# Patient Record
Sex: MALE | Race: WHITE | NOT HISPANIC OR LATINO | Employment: OTHER | URBAN - METROPOLITAN AREA
[De-identification: names, ages, dates, MRNs, and addresses within clinical notes are randomized per-mention and may not be internally consistent; named-entity substitution may affect disease eponyms.]

---

## 2018-03-28 ENCOUNTER — HOSPITAL ENCOUNTER (OUTPATIENT)
Dept: ORTHOPEDIC SURGERY | Facility: CLINIC | Age: 53
Discharge: HOME | End: 2018-03-28
Attending: ORTHOPAEDIC SURGERY | Admitting: ORTHOPAEDIC SURGERY

## 2018-05-01 ENCOUNTER — HOSPITAL ENCOUNTER (OUTPATIENT)
Dept: PHYSICAL THERAPY | Facility: HOSPITAL | Age: 53
Setting detail: RECURRING SERIES
Discharge: HOME | End: 2018-05-22
Attending: ORTHOPAEDIC SURGERY | Admitting: ORTHOPAEDIC SURGERY

## 2018-07-10 ENCOUNTER — HOSPITAL ENCOUNTER (OUTPATIENT)
Dept: PREADMISSION TESTING | Facility: HOSPITAL | Age: 53
Discharge: HOME | End: 2018-07-10
Attending: ORTHOPAEDIC SURGERY | Admitting: ORTHOPAEDIC SURGERY

## 2018-07-10 LAB
ANION GAP SERPL CALC-SCNC: 12.1 MMOL/L (ref 10–20)
APTT BLD: 22.8 SEC (ref 24–31)
BACTERIA SPEC AEROBE CULT: NORMAL
BASOPHILS # BLD AUTO: 0.1 10*3/UL (ref 0–0.2)
BASOPHILS NFR BLD AUTO: 1 % (ref 0–2)
BUN SERPL-MCNC: 7 MG/DL (ref 8–20)
BUN/CREAT SERPL: 7 (ref 6.2–20.3)
CALCIUM SERPL-MCNC: 9.1 MG/DL (ref 8.9–10.3)
CHLORIDE SERPL-SCNC: 103 MMOL/L (ref 101–111)
CONV CO2: 26 MMOL/L (ref 22–32)
CREAT UR-MCNC: 1 MG/DL (ref 0.7–1.2)
DIFFERENTIAL METHOD BLD: (no result)
EOSINOPHIL # BLD AUTO: 0.2 10*3/UL (ref 0–0.3)
EOSINOPHIL # BLD AUTO: 3 % (ref 0–3)
ERYTHROCYTE [DISTWIDTH] IN BLOOD BY AUTOMATED COUNT: 15.4 % (ref 11.5–14.5)
GLUCOSE SERPL-MCNC: 177 MG/DL (ref 65–99)
HCT VFR BLD AUTO: 39.9 % (ref 40–54)
HGB BLD-MCNC: 13.3 G/DL (ref 14–18)
INR PPP: 1
LYMPHOCYTES # BLD AUTO: 1.4 10*3/UL (ref 0.8–4.8)
LYMPHOCYTES NFR BLD AUTO: 21 % (ref 18–42)
Lab: NORMAL
MCH RBC QN AUTO: 25.7 PG (ref 26–32)
MCHC RBC AUTO-ENTMCNC: 33.2 G/DL (ref 32–36)
MCV RBC AUTO: 77.4 FL (ref 80–94)
MICRO REPORT STATUS: NORMAL
MONOCYTES # BLD AUTO: 0.5 10*3/UL (ref 0.1–1.3)
MONOCYTES NFR BLD AUTO: 8 % (ref 2–11)
NEUTROPHILS # BLD AUTO: 4.4 10*3/UL (ref 2.3–8.6)
NEUTROPHILS NFR BLD AUTO: 67 % (ref 50–75)
NRBC BLD AUTO-RTO: 0 /100{WBCS}
NRBC/RBC NFR BLD MANUAL: 0 10*3/UL
PLATELET # BLD AUTO: 227 10*3/UL (ref 150–450)
PMV BLD AUTO: 8.8 FL (ref 7.4–10.4)
POTASSIUM SERPL-SCNC: 4.1 MMOL/L (ref 3.6–5.1)
PROTHROMBIN TIME: 10.9 SEC (ref 9.6–11.7)
RBC # BLD AUTO: 5.16 10*6/UL (ref 4.6–6)
SODIUM SERPL-SCNC: 137 MMOL/L (ref 136–144)
SPECIMEN SOURCE: NORMAL
WBC # BLD AUTO: 6.6 10*3/UL (ref 4.5–11.5)

## 2018-07-13 ENCOUNTER — HOSPITAL ENCOUNTER (OUTPATIENT)
Dept: PREOP | Facility: HOSPITAL | Age: 53
Setting detail: HOSPITAL OUTPATIENT SURGERY
Discharge: HOME | End: 2018-07-13
Attending: ORTHOPAEDIC SURGERY | Admitting: ORTHOPAEDIC SURGERY

## 2018-07-13 LAB
GLUCOSE BLD-MCNC: 107 MG/DL (ref 70–105)
GLUCOSE BLD-MCNC: 141 MG/DL (ref 70–105)

## 2019-07-03 ENCOUNTER — OFFICE VISIT (OUTPATIENT)
Dept: FAMILY MEDICINE CLINIC | Facility: CLINIC | Age: 54
End: 2019-07-03

## 2019-07-03 ENCOUNTER — HOSPITAL ENCOUNTER (OUTPATIENT)
Dept: GENERAL RADIOLOGY | Facility: HOSPITAL | Age: 54
Discharge: HOME | End: 2019-07-03
Admitting: NURSE PRACTITIONER

## 2019-07-03 VITALS
HEART RATE: 76 BPM | DIASTOLIC BLOOD PRESSURE: 95 MMHG | WEIGHT: 225 LBS | SYSTOLIC BLOOD PRESSURE: 143 MMHG | HEIGHT: 72 IN | TEMPERATURE: 98.7 F | OXYGEN SATURATION: 98 % | BODY MASS INDEX: 30.48 KG/M2

## 2019-07-03 DIAGNOSIS — J40 BRONCHITIS: Primary | ICD-10-CM

## 2019-07-03 PROBLEM — E11.9 TYPE 2 DIABETES MELLITUS (HCC): Status: ACTIVE | Noted: 2018-05-03

## 2019-07-03 PROBLEM — G54.0 THORACIC OUTLET SYNDROME: Status: ACTIVE | Noted: 2017-08-15

## 2019-07-03 PROCEDURE — 99213 OFFICE O/P EST LOW 20 MIN: CPT | Performed by: NURSE PRACTITIONER

## 2019-07-03 PROCEDURE — 71046 X-RAY EXAM CHEST 2 VIEWS: CPT

## 2019-07-03 RX ORDER — ALBUTEROL SULFATE 90 UG/1
2 AEROSOL, METERED RESPIRATORY (INHALATION) EVERY 4 HOURS PRN
Qty: 1 INHALER | Refills: 0 | Status: SHIPPED | OUTPATIENT
Start: 2019-07-03 | End: 2020-03-09

## 2019-07-03 RX ORDER — BLOOD-GLUCOSE METER
EACH MISCELLANEOUS
COMMUNITY
Start: 2018-05-10

## 2019-07-03 RX ORDER — BENZONATATE 100 MG/1
100 CAPSULE ORAL 3 TIMES DAILY PRN
Qty: 30 CAPSULE | Refills: 0 | Status: SHIPPED | OUTPATIENT
Start: 2019-07-03 | End: 2020-03-09

## 2019-07-03 RX ORDER — LANCETS 33 GAUGE
EACH MISCELLANEOUS
COMMUNITY
Start: 2018-05-10

## 2019-07-03 RX ORDER — DEXTROMETHORPHAN HYDROBROMIDE AND PROMETHAZINE HYDROCHLORIDE 15; 6.25 MG/5ML; MG/5ML
5 SYRUP ORAL NIGHTLY PRN
Qty: 180 ML | Refills: 0 | Status: SHIPPED | OUTPATIENT
Start: 2019-07-03 | End: 2020-03-09

## 2019-07-03 RX ORDER — LOSARTAN POTASSIUM 50 MG/1
50 TABLET ORAL DAILY
Refills: 2 | COMMUNITY
Start: 2019-04-03 | End: 2019-12-06 | Stop reason: SDUPTHER

## 2019-07-03 RX ORDER — OMEPRAZOLE 20 MG/1
CAPSULE, DELAYED RELEASE ORAL
COMMUNITY
Start: 2015-08-24 | End: 2021-09-21 | Stop reason: SDUPTHER

## 2019-07-03 NOTE — PATIENT INSTRUCTIONS
Use Zyrtec daily  Postnasal Drip  Postnasal drip is the feeling of mucus going down the back of your throat. Mucus is a slimy substance that moistens and cleans your nose and throat, as well as the air pockets in face bones near your forehead and cheeks (sinuses). Small amounts of mucus pass from your nose and sinuses down the back of your throat all the time. This is normal. When you produce too much mucus or the mucus gets too thick, you can feel it.  Some common causes of postnasal drip include:  · Having more mucus because of:  ? A cold or the flu.  ? Allergies.  ? Cold air.  ? Certain medicines.  · Having more mucus that is thicker because of:  ? A sinus or nasal infection.  ? Dry air.  ? A food allergy.    Follow these instructions at home:  Relieving discomfort  · Gargle with a salt-water mixture 3-4 times a day or as needed. To make a salt-water mixture, completely dissolve ½-1 tsp of salt in 1 cup of warm water.  · If the air in your home is dry, use a humidifier to add moisture to the air.  · Use a saline spray or container (neti pot) to flush out the nose (nasal irrigation). These methods can help clear away mucus and keep the nasal passages moist.  General instructions  · Take over-the-counter and prescription medicines only as told by your health care provider.  · Follow instructions from your health care provider about eating or drinking restrictions. You may need to avoid caffeine.  · Avoid things that you know you are allergic to (allergens), like dust, mold, pollen, pets, or certain foods.  · Drink enough fluid to keep your urine pale yellow.  · Keep all follow-up visits as told by your health care provider. This is important.  Contact a health care provider if:  · You have a fever.  · You have a sore throat.  · You have difficulty swallowing.  · You have headache.  · You have sinus pain.  · You have a cough that does not go away.  · The mucus from your nose becomes thick and is green or yellow in  color.  · You have cold or flu symptoms that last more than 10 days.  Summary  · Postnasal drip is the feeling of mucus going down the back of your throat.  · If your health care provider approves, use nasal irrigation or a nasal spray 2?4 times a day.  · Avoid things that you know you are allergic to (allergens), like dust, mold, pollen, pets, or certain foods.  This information is not intended to replace advice given to you by your health care provider. Make sure you discuss any questions you have with your health care provider.  Document Released: 04/02/2018 Document Revised: 04/02/2018 Document Reviewed: 04/02/2018  [employer] Patient Education © 2019 [employer]

## 2019-07-03 NOTE — PROGRESS NOTES
Subjective   Dhruv Levine is a 53 y.o. male who presents for evaluation of nonproductive cough. Symptoms began 1 month ago. Symptoms have been gradually improving since that time. He reports symptoms started with cough, nasal congestion, sore throat. Nasal congestion and sore throat resolved but continues to have nagging cough.  Denies SOA. Denies fever.  He does have some chest tightness at times with cough. Cough is not productive. Denies wheezing.    The following portions of the patient's history were reviewed and updated as appropriate: allergies, current medications, past family history, past medical history, past social history, past surgical history and problem list.    Review of Systems  Constitutional: negative  Eyes: negative  Ears, nose, mouth, throat, and face: negative  Respiratory: positive for cough  Cardiovascular: negative     Objective    General appearance: alert, appears stated age, cooperative and no distress  Head: Normocephalic, without obvious abnormality, atraumatic  Eyes: conjunctivae/corneas clear. PERRL, EOM's intact. Fundi benign.  Ears: normal TM's and external ear canals both ears  Nose: Nares normal. Septum midline. Mucosa normal. No drainage or sinus tenderness.  Throat: lips, mucosa, and tongue normal; teeth and gums normal  Lungs: clear to auscultation bilaterally  Heart: regular rate and rhythm, S1, S2 normal, no murmur, click, rub or gallop     Assessment/Plan   URI with Post Nasal Drip   -  Send CXR r/o pneumonia  -  Treat with prn cough medication.     Worsening signs and symptoms discussed.  Rest, fluids, acetaminophen, and humidification.  Follow up as needed for persistent, worsening cough, or appearance of new symptoms.

## 2019-12-06 RX ORDER — LOSARTAN POTASSIUM 50 MG/1
50 TABLET ORAL DAILY
Qty: 30 TABLET | Refills: 2 | Status: SHIPPED | OUTPATIENT
Start: 2019-12-06 | End: 2020-03-09 | Stop reason: SDUPTHER

## 2019-12-26 ENCOUNTER — TELEPHONE (OUTPATIENT)
Dept: FAMILY MEDICINE CLINIC | Facility: CLINIC | Age: 54
End: 2019-12-26

## 2020-03-09 ENCOUNTER — OFFICE VISIT (OUTPATIENT)
Dept: FAMILY MEDICINE CLINIC | Facility: CLINIC | Age: 55
End: 2020-03-09

## 2020-03-09 VITALS
OXYGEN SATURATION: 95 % | DIASTOLIC BLOOD PRESSURE: 95 MMHG | HEIGHT: 72 IN | WEIGHT: 229.4 LBS | BODY MASS INDEX: 31.07 KG/M2 | SYSTOLIC BLOOD PRESSURE: 147 MMHG | HEART RATE: 86 BPM

## 2020-03-09 DIAGNOSIS — I10 ESSENTIAL HYPERTENSION: Primary | ICD-10-CM

## 2020-03-09 DIAGNOSIS — Z00.00 PREVENTATIVE HEALTH CARE: ICD-10-CM

## 2020-03-09 DIAGNOSIS — Z12.5 PROSTATE CANCER SCREENING: ICD-10-CM

## 2020-03-09 DIAGNOSIS — K22.719 BARRETT'S ESOPHAGUS WITH DYSPLASIA: ICD-10-CM

## 2020-03-09 DIAGNOSIS — E11.9 TYPE 2 DIABETES MELLITUS WITHOUT COMPLICATION, WITHOUT LONG-TERM CURRENT USE OF INSULIN (HCC): ICD-10-CM

## 2020-03-09 LAB
DEPRECATED RDW RBC AUTO: 37.6 FL (ref 37–54)
ERYTHROCYTE [DISTWIDTH] IN BLOOD BY AUTOMATED COUNT: 13 % (ref 12.3–15.4)
HBA1C MFR BLD: 5.9 % (ref 3.5–5.6)
HCT VFR BLD AUTO: 45.3 % (ref 37.5–51)
HGB BLD-MCNC: 14.9 G/DL (ref 13–17.7)
MCH RBC QN AUTO: 26.7 PG (ref 26.6–33)
MCHC RBC AUTO-ENTMCNC: 32.9 G/DL (ref 31.5–35.7)
MCV RBC AUTO: 81.2 FL (ref 79–97)
PLATELET # BLD AUTO: 244 10*3/MM3 (ref 140–450)
PMV BLD AUTO: 10.6 FL (ref 6–12)
RBC # BLD AUTO: 5.58 10*6/MM3 (ref 4.14–5.8)
WBC NRBC COR # BLD: 6.09 10*3/MM3 (ref 3.4–10.8)

## 2020-03-09 PROCEDURE — 83036 HEMOGLOBIN GLYCOSYLATED A1C: CPT | Performed by: NURSE PRACTITIONER

## 2020-03-09 PROCEDURE — 84443 ASSAY THYROID STIM HORMONE: CPT | Performed by: NURSE PRACTITIONER

## 2020-03-09 PROCEDURE — 36415 COLL VENOUS BLD VENIPUNCTURE: CPT | Performed by: NURSE PRACTITIONER

## 2020-03-09 PROCEDURE — 80061 LIPID PANEL: CPT | Performed by: NURSE PRACTITIONER

## 2020-03-09 PROCEDURE — G0103 PSA SCREENING: HCPCS | Performed by: NURSE PRACTITIONER

## 2020-03-09 PROCEDURE — 85027 COMPLETE CBC AUTOMATED: CPT | Performed by: NURSE PRACTITIONER

## 2020-03-09 PROCEDURE — 99214 OFFICE O/P EST MOD 30 MIN: CPT | Performed by: NURSE PRACTITIONER

## 2020-03-09 PROCEDURE — 80053 COMPREHEN METABOLIC PANEL: CPT | Performed by: NURSE PRACTITIONER

## 2020-03-09 RX ORDER — LOSARTAN POTASSIUM 50 MG/1
50 TABLET ORAL DAILY
Qty: 30 TABLET | Refills: 11 | Status: SHIPPED | OUTPATIENT
Start: 2020-03-09 | End: 2021-02-24

## 2020-03-09 NOTE — ASSESSMENT & PLAN NOTE
Hypertension is stable.  Continue current treatment regimen.  Dietary sodium restriction.  Regular aerobic exercise.  Continue current medications.  Blood pressure will be reassessed at the next regular appointment.

## 2020-03-09 NOTE — PROGRESS NOTES
Chief Complaint   Patient presents with   • Establish Care   • Med Refill       HPI     Diabetes mellitus type II, feels stable on meds, denies any signs/symptoms of hyper/hypoglycemia, blurry vision, polydipsia, polyuria, nocturia, and unintentional weight loss. Reports -110 daily    HTN, stable on meds and takes as directed, denies chest pain, headache, shortness of air, palpitations and swelling of extremities.           Past Medical History:   Diagnosis Date   • Abdominal pain, epigastric    • Acute left otitis media    • Andersen's esophagus    • Biceps tendonitis, right    • BPPV (benign paroxysmal positional vertigo), left    • Bronchitis, acute    • Diabetes mellitus type II, controlled (CMS/[facility])    • Diverticulosis    • Dyspnea    • Exposure to hepatitis A    • Fatigue    • GERD (gastroesophageal reflux disease)    • Hand swelling    • HBP (high blood pressure)    • Hiatal hernia    • Insomnia    • Mass of finger    • Orthopedic aftercare    • Overweight    • Pain in right shoulder    • Persistent cough    • Sinus congestion    • Situational anxiety    • Thoracic outlet syndrome      Past Surgical History:   Procedure Laterality Date   • CHOLECYSTECTOMY     • SHOULDER ARTHROSCOPY Right 07/13/2018   • SHOULDER SURGERY Right 2011/2017     Family History   Problem Relation Age of Onset   • No Known Problems Mother    • Hypertension Father    • No Known Problems Sister    • No Known Problems Brother    • No Known Problems Daughter    • No Known Problems Son    • No Known Problems Maternal Aunt    • No Known Problems Maternal Uncle    • No Known Problems Paternal Aunt    • No Known Problems Paternal Uncle    • No Known Problems Maternal Grandmother    • No Known Problems Maternal Grandfather    • No Known Problems Paternal Grandmother    • No Known Problems Paternal Grandfather      Social History     Tobacco Use   • Smoking status: Never Smoker   • Smokeless tobacco: Never Used   Substance Use Topics   •  Alcohol use: Yes         Current Outpatient Medications:   •  Blood Glucose Monitoring Suppl (ONE TOUCH ULTRA 2) w/Device kit, ONETOUCH ULTRA 2 w/Device KIT, Disp: , Rfl:   •  glucose blood (ONE TOUCH ULTRA TEST) test strip, ONETOUCH ULTRA BLUE STRP, Disp: , Rfl:   •  losartan (COZAAR) 50 MG tablet, Take 1 tablet by mouth Daily., Disp: 30 tablet, Rfl: 11  •  metFORMIN (GLUCOPHAGE) 500 MG tablet, Take 1 tablet by mouth 2 (Two) Times a Day With Meals., Disp: 60 tablet, Rfl: 11  •  omeprazole (priLOSEC) 20 MG capsule, OMEPRAZOLE 20 MG CPDR, Disp: , Rfl:   •  ONETOUCH DELICA LANCETS 33G misc, ONETOUCH DELICA LANCETS 33G, Disp: , Rfl:         The following portions of the patient's history were reviewed and updated as appropriate: allergies, current medications, past family history, past medical history, past social history, past surgical history and problem list.    Review of Systems   Constitutional: Negative for chills, fatigue and fever.   HENT: Negative for dental problem, ear pain, sinus pressure and sore throat.    Eyes: Negative for visual disturbance.   Respiratory: Negative for cough, shortness of breath and wheezing.    Gastrointestinal: Negative for abdominal pain, blood in stool, constipation, diarrhea, nausea, vomiting and GERD.   Genitourinary: Negative for difficulty urinating, frequency, urgency and urinary incontinence.   Musculoskeletal: Negative for arthralgias, back pain, gait problem, joint swelling, myalgias and neck pain.   Skin: Negative for dry skin, pallor and rash.   Neurological: Negative for dizziness, seizures, speech difficulty and weakness.   Hematological: Negative for adenopathy.   Psychiatric/Behavioral: Negative for sleep disturbance, depressed mood and stress. The patient is not nervous/anxious.      Vitals:    03/09/20 0822   BP: 147/95   BP Location: Left arm   Patient Position: Sitting   Cuff Size: Large Adult   Pulse: 86   SpO2: 95%   Weight: 104 kg (229 lb 6.4 oz)   Height:  "182.9 cm (72.01\")     Body mass index is 31.11 kg/m².  Physical Exam   Constitutional: He is oriented to person, place, and time. He appears well-developed and well-nourished. No distress.   HENT:   Head: Normocephalic and atraumatic.   Eyes: Pupils are equal, round, and reactive to light.   Neck: Normal range of motion. No thyromegaly present.   Cardiovascular: Normal rate, regular rhythm, normal heart sounds and intact distal pulses.   Pulmonary/Chest: Effort normal and breath sounds normal. No respiratory distress.   Abdominal: Soft. Bowel sounds are normal. He exhibits no distension. There is no tenderness.   Musculoskeletal: Normal range of motion.   Neurological: He is alert and oriented to person, place, and time.   Skin: Skin is warm and dry. He is not diaphoretic. No erythema.   Psychiatric: He has a normal mood and affect. His behavior is normal. Judgment and thought content normal.   Nursing note and vitals reviewed.    No visits with results within 7 Day(s) from this visit.   Latest known visit with results is:   No results found for any previous visit.       Diagnoses and all orders for this visit:    1. Essential hypertension (Primary)  Assessment & Plan:  Hypertension is stable.  Continue current treatment regimen.  Dietary sodium restriction.  Regular aerobic exercise.  Continue current medications.  Blood pressure will be reassessed at the next regular appointment.    Orders:  -     Comprehensive Metabolic Panel  -     CBC (No Diff)  -     TSH  -     Lipid Panel    2. Preventative health care    3. Type 2 diabetes mellitus without complication, without long-term current use of insulin (CMS/[facility])  Assessment & Plan:  Diabetes is unchanged.   Continue current treatment regimen.  Regular aerobic exercise.  Discussed foot care.  Reminded to get yearly retinal exam.  Diabetes will be reassessed in 1 year.    Orders:  -     Hemoglobin A1c    4. Prostate cancer screening  -     PSA Screen    5. Andersen's " esophagus with dysplasia  Assessment & Plan:  Patient is due for repeat EGD/colonoscopy.  Instructed/recommended patient to call gastro to reschedule      Other orders  -     losartan (COZAAR) 50 MG tablet; Take 1 tablet by mouth Daily.  Dispense: 30 tablet; Refill: 11  -     metFORMIN (GLUCOPHAGE) 500 MG tablet; Take 1 tablet by mouth 2 (Two) Times a Day With Meals.  Dispense: 60 tablet; Refill: 11    Check labs above, and notify pt of results  rf meds  If labs stable will plan to see back in 1 year or earlier if needed

## 2020-03-09 NOTE — ASSESSMENT & PLAN NOTE
Patient is due for repeat EGD/colonoscopy.  Instructed/recommended patient to call gastro to reschedule

## 2020-03-09 NOTE — ASSESSMENT & PLAN NOTE
Diabetes is unchanged.   Continue current treatment regimen.  Regular aerobic exercise.  Discussed foot care.  Reminded to get yearly retinal exam.  Diabetes will be reassessed in 1 year.

## 2020-03-10 LAB
ALBUMIN SERPL-MCNC: 4.6 G/DL (ref 3.5–5.2)
ALBUMIN/GLOB SERPL: 1.7 G/DL
ALP SERPL-CCNC: 75 U/L (ref 39–117)
ALT SERPL W P-5'-P-CCNC: 22 U/L (ref 1–41)
ANION GAP SERPL CALCULATED.3IONS-SCNC: 13.1 MMOL/L (ref 5–15)
AST SERPL-CCNC: 21 U/L (ref 1–40)
BILIRUB SERPL-MCNC: 0.4 MG/DL (ref 0.2–1.2)
BUN BLD-MCNC: 11 MG/DL (ref 6–20)
BUN/CREAT SERPL: 11.7 (ref 7–25)
CALCIUM SPEC-SCNC: 9.7 MG/DL (ref 8.6–10.5)
CHLORIDE SERPL-SCNC: 102 MMOL/L (ref 98–107)
CHOLEST SERPL-MCNC: 182 MG/DL (ref 0–200)
CO2 SERPL-SCNC: 25.9 MMOL/L (ref 22–29)
CREAT BLD-MCNC: 0.94 MG/DL (ref 0.76–1.27)
GFR SERPL CREATININE-BSD FRML MDRD: 83 ML/MIN/1.73
GLOBULIN UR ELPH-MCNC: 2.7 GM/DL
GLUCOSE BLD-MCNC: 105 MG/DL (ref 65–99)
HDLC SERPL-MCNC: 38 MG/DL (ref 40–60)
LDLC SERPL CALC-MCNC: 126 MG/DL (ref 0–100)
LDLC/HDLC SERPL: 3.32 {RATIO}
POTASSIUM BLD-SCNC: 4.8 MMOL/L (ref 3.5–5.2)
PROT SERPL-MCNC: 7.3 G/DL (ref 6–8.5)
PSA SERPL-MCNC: 1.99 NG/ML (ref 0–4)
SODIUM BLD-SCNC: 141 MMOL/L (ref 136–145)
TRIGL SERPL-MCNC: 90 MG/DL (ref 0–150)
TSH SERPL DL<=0.05 MIU/L-ACNC: 1.05 UIU/ML (ref 0.27–4.2)
VLDLC SERPL-MCNC: 18 MG/DL (ref 5–40)

## 2020-09-15 ENCOUNTER — OFFICE VISIT (OUTPATIENT)
Dept: FAMILY MEDICINE CLINIC | Facility: CLINIC | Age: 55
End: 2020-09-15

## 2020-09-15 VITALS
SYSTOLIC BLOOD PRESSURE: 162 MMHG | BODY MASS INDEX: 30.77 KG/M2 | WEIGHT: 227.2 LBS | HEART RATE: 66 BPM | HEIGHT: 72 IN | OXYGEN SATURATION: 100 % | TEMPERATURE: 97.3 F | DIASTOLIC BLOOD PRESSURE: 95 MMHG

## 2020-09-15 DIAGNOSIS — G47.09 OTHER INSOMNIA: ICD-10-CM

## 2020-09-15 DIAGNOSIS — Z00.00 PREVENTATIVE HEALTH CARE: ICD-10-CM

## 2020-09-15 DIAGNOSIS — I10 ESSENTIAL HYPERTENSION: ICD-10-CM

## 2020-09-15 DIAGNOSIS — K21.9 GASTROESOPHAGEAL REFLUX DISEASE WITHOUT ESOPHAGITIS: ICD-10-CM

## 2020-09-15 DIAGNOSIS — K22.719 BARRETT'S ESOPHAGUS WITH DYSPLASIA: ICD-10-CM

## 2020-09-15 DIAGNOSIS — E11.9 TYPE 2 DIABETES MELLITUS WITHOUT COMPLICATION, WITHOUT LONG-TERM CURRENT USE OF INSULIN (HCC): Primary | ICD-10-CM

## 2020-09-15 LAB — HBA1C MFR BLD: 5.9 % (ref 3.5–5.6)

## 2020-09-15 PROCEDURE — 80053 COMPREHEN METABOLIC PANEL: CPT | Performed by: NURSE PRACTITIONER

## 2020-09-15 PROCEDURE — 84443 ASSAY THYROID STIM HORMONE: CPT | Performed by: NURSE PRACTITIONER

## 2020-09-15 PROCEDURE — 80061 LIPID PANEL: CPT | Performed by: NURSE PRACTITIONER

## 2020-09-15 PROCEDURE — 86803 HEPATITIS C AB TEST: CPT | Performed by: NURSE PRACTITIONER

## 2020-09-15 PROCEDURE — 36415 COLL VENOUS BLD VENIPUNCTURE: CPT | Performed by: NURSE PRACTITIONER

## 2020-09-15 PROCEDURE — 83036 HEMOGLOBIN GLYCOSYLATED A1C: CPT | Performed by: NURSE PRACTITIONER

## 2020-09-15 PROCEDURE — 99396 PREV VISIT EST AGE 40-64: CPT | Performed by: NURSE PRACTITIONER

## 2020-09-15 PROCEDURE — 85027 COMPLETE CBC AUTOMATED: CPT | Performed by: NURSE PRACTITIONER

## 2020-09-15 RX ORDER — HYDROXYZINE HYDROCHLORIDE 25 MG/1
25 TABLET, FILM COATED ORAL NIGHTLY PRN
Qty: 40 TABLET | Refills: 0 | Status: SHIPPED | OUTPATIENT
Start: 2020-09-15 | End: 2021-03-24

## 2020-09-15 NOTE — PROGRESS NOTES
Chief Complaint   Patient presents with   • Hypertension     patient has had colonoscopy but doesn't remember when.   • Follow-up     6mo   • Diabetes       HPI     HTN, stable on meds and takes as directed, denies chest pain, headache, shortness of air, palpitations and swelling of extremities.     Diabetes mellitus type II, feels stable on meds, denies any signs/symptoms of hyper/hypoglycemia, blurry vision, polydipsia, polyuria, nocturia, and unintentional weight loss, previous hemoglobin A1c March 2020, 5.9. BG running 108-102.     Hyperlipidemia, slight elevation of LDL on March labs, he mostly eats well, is active and exercises 2-3 times per week, the patient is not on a statin, denies muscle aches, constipation, diarrhea, GI upset, fatigue, chest pain/pressure, exercise intolerance, dyspnea, palpitations, syncope and pedal edema.      GERD, with history of diverticulosis and Andersen's esophagus, last colonoscopy per Dr. Weber, approximately 5 years ago and [employer] insurance apparently will not cover procedures.  Patient reports he is stable on OTC omeprazole, denies nausea, vomiting, constipation, abdominal pain and diarrhea.    Insomnia, started more than 1 year ago, patient reports no difficulty falling asleep but does not stay asleep, mostly associated with anxiety, wakes up with his mind racing.  Patient denies abdominal pain, arthralgias, chills, coughing, fever, joint swelling, myalgias, nausea, neck pain, rash, sore throat, vomiting or weakness, pt reports daytime fatigue. Treatments tried: OTC sleep aids ineffective including melatonin.        The following portions of the patient's history were reviewed and updated as appropriate: allergies, current medications, past family history, past medical history, past social history, past surgical history and problem list.    Past Medical History:   Diagnosis Date   • Abdominal pain, epigastric    • Acute left otitis media    • Andersen's esophagus    • Biceps  tendonitis, right    • BPPV (benign paroxysmal positional vertigo), left    • Bronchitis, acute    • Diabetes mellitus type II, controlled (CMS/HCC)    • Diverticulosis    • Dyspnea    • Exposure to hepatitis A    • Fatigue    • GERD (gastroesophageal reflux disease)    • Hand swelling    • HBP (high blood pressure)    • Hiatal hernia    • Insomnia    • Mass of finger    • Orthopedic aftercare    • Overweight    • Pain in right shoulder    • Persistent cough    • Sinus congestion    • Situational anxiety    • Thoracic outlet syndrome      Past Surgical History:   Procedure Laterality Date   • CHOLECYSTECTOMY     • SHOULDER ARTHROSCOPY Right 07/13/2018   • SHOULDER SURGERY Right 2011/2017     Family History   Problem Relation Age of Onset   • No Known Problems Mother    • Hypertension Father    • No Known Problems Sister    • No Known Problems Brother    • No Known Problems Daughter    • No Known Problems Son    • No Known Problems Maternal Aunt    • No Known Problems Maternal Uncle    • No Known Problems Paternal Aunt    • No Known Problems Paternal Uncle    • No Known Problems Maternal Grandmother    • No Known Problems Maternal Grandfather    • No Known Problems Paternal Grandmother    • No Known Problems Paternal Grandfather      Social History     Tobacco Use   • Smoking status: Never Smoker   • Smokeless tobacco: Never Used   Substance Use Topics   • Alcohol use: Yes         Current Outpatient Medications:   •  Blood Glucose Monitoring Suppl (ONE TOUCH ULTRA 2) w/Device kit, ONETOUCH ULTRA 2 w/Device KIT, Disp: , Rfl:   •  glucose blood (ONE TOUCH ULTRA TEST) test strip, ONETOUCH ULTRA BLUE STRP, Disp: , Rfl:   •  losartan (COZAAR) 50 MG tablet, Take 1 tablet by mouth Daily., Disp: 30 tablet, Rfl: 11  •  metFORMIN (GLUCOPHAGE) 500 MG tablet, Take 1 tablet by mouth 2 (Two) Times a Day With Meals., Disp: 60 tablet, Rfl: 11  •  omeprazole (priLOSEC) 20 MG capsule, OMEPRAZOLE 20 MG CPDR, Disp: , Rfl:   •  ONETOUCH  "DELICA LANCETS 33G [facility], RAVINDERUCH DELICA LANCETS 33G, Disp: , Rfl:   •  hydrOXYzine (ATARAX) 25 MG tablet, Take 1 tablet by mouth At Night As Needed for Anxiety., Disp: 40 tablet, Rfl: 0      Review of Systems       Obtained as mentioned in HPI, otherwise negative.       Vitals:    09/15/20 1024   BP: (!) 153/103   BP Location: Left arm   Patient Position: Sitting   Cuff Size: Adult   Pulse: 66   Temp: 97.3 °F (36.3 °C)   TempSrc: Skin   SpO2: 100%   Weight: 103 kg (227 lb 3.2 oz)   Height: 182.9 cm (72.01\")     Body mass index is 30.81 kg/m².    Physical Exam  Vitals signs and nursing note reviewed.   Constitutional:       General: He is not in acute distress.     Appearance: He is well-developed. He is not diaphoretic.   HENT:      Head: Normocephalic and atraumatic.   Eyes:      Pupils: Pupils are equal, round, and reactive to light.   Neck:      Musculoskeletal: Normal range of motion.      Thyroid: No thyromegaly.   Cardiovascular:      Rate and Rhythm: Normal rate and regular rhythm.      Heart sounds: Normal heart sounds. No murmur.   Pulmonary:      Effort: Pulmonary effort is normal. No respiratory distress.      Breath sounds: Normal breath sounds.   Abdominal:      General: Bowel sounds are normal. There is no distension.      Palpations: Abdomen is soft.      Tenderness: There is no abdominal tenderness.   Musculoskeletal: Normal range of motion.   Skin:     General: Skin is warm and dry.      Findings: No erythema.   Neurological:      Mental Status: He is alert and oriented to person, place, and time.   Psychiatric:         Behavior: Behavior normal.         Thought Content: Thought content normal.         Judgment: Judgment normal.         No visits with results within 7 Day(s) from this visit.   Latest known visit with results is:   Office Visit on 03/09/2020   Component Date Value Ref Range Status   • Glucose 03/09/2020 105* 65 - 99 mg/dL Final   • BUN 03/09/2020 11  6 - 20 mg/dL Final   • " Creatinine 03/09/2020 0.94  0.76 - 1.27 mg/dL Final   • Sodium 03/09/2020 141  136 - 145 mmol/L Final   • Potassium 03/09/2020 4.8  3.5 - 5.2 mmol/L Final   • Chloride 03/09/2020 102  98 - 107 mmol/L Final   • CO2 03/09/2020 25.9  22.0 - 29.0 mmol/L Final   • Calcium 03/09/2020 9.7  8.6 - 10.5 mg/dL Final   • Total Protein 03/09/2020 7.3  6.0 - 8.5 g/dL Final   • Albumin 03/09/2020 4.60  3.50 - 5.20 g/dL Final   • ALT (SGPT) 03/09/2020 22  1 - 41 U/L Final   • AST (SGOT) 03/09/2020 21  1 - 40 U/L Final   • Alkaline Phosphatase 03/09/2020 75  39 - 117 U/L Final   • Total Bilirubin 03/09/2020 0.4  0.2 - 1.2 mg/dL Final   • eGFR Non African Amer 03/09/2020 83  >60 mL/min/1.73 Final   • Globulin 03/09/2020 2.7  gm/dL Final   • A/G Ratio 03/09/2020 1.7  g/dL Final   • BUN/Creatinine Ratio 03/09/2020 11.7  7.0 - 25.0 Final   • Anion Gap 03/09/2020 13.1  5.0 - 15.0 mmol/L Final   • PSA 03/09/2020 1.990  0.000 - 4.000 ng/mL Final   • WBC 03/09/2020 6.09  3.40 - 10.80 10*3/mm3 Final   • RBC 03/09/2020 5.58  4.14 - 5.80 10*6/mm3 Final   • Hemoglobin 03/09/2020 14.9  13.0 - 17.7 g/dL Final   • Hematocrit 03/09/2020 45.3  37.5 - 51.0 % Final   • MCV 03/09/2020 81.2  79.0 - 97.0 fL Final   • MCH 03/09/2020 26.7  26.6 - 33.0 pg Final   • MCHC 03/09/2020 32.9  31.5 - 35.7 g/dL Final   • RDW 03/09/2020 13.0  12.3 - 15.4 % Final   • RDW-SD 03/09/2020 37.6  37.0 - 54.0 fl Final   • MPV 03/09/2020 10.6  6.0 - 12.0 fL Final   • Platelets 03/09/2020 244  140 - 450 10*3/mm3 Final   • Hemoglobin A1C 03/09/2020 5.9* 3.5 - 5.6 % Final   • TSH 03/09/2020 1.050  0.270 - 4.200 uIU/mL Final   • Total Cholesterol 03/09/2020 182  0 - 200 mg/dL Final   • Triglycerides 03/09/2020 90  0 - 150 mg/dL Final   • HDL Cholesterol 03/09/2020 38* 40 - 60 mg/dL Final   • LDL Cholesterol  03/09/2020 126* 0 - 100 mg/dL Final   • VLDL Cholesterol 03/09/2020 18  5 - 40 mg/dL Final   • LDL/HDL Ratio 03/09/2020 3.32   Final       Diagnoses and all orders for this  visit:    1. Type 2 diabetes mellitus without complication, without long-term current use of insulin (CMS/[facility]) (Primary)  Comments:  Stable, continue metformin, no refill needed today.  Check A1c and urine, notify patient results  Orders:  -     Hemoglobin A1c  -     MicroAlbumin, Urine, Random - Urine, Clean Catch    2. Essential hypertension  Comments:  bp elevated, cont losartan, pt to check at home and notify if sbp >150. adjust prn  Orders:  -     Lipid Panel  -     Comprehensive Metabolic Panel  -     CBC (No Diff)  -     TSH    3. Gastroesophageal reflux disease without esophagitis  Comments:  cont omeprazole prn    4. Andersen's esophagus with dysplasia  Comments:  needs to repeat EGD/colonoscopy when insurance will cover, f/u with Dr. Elaine.     5. Other insomnia  Comments:  likely 2/2 anxiety/stress, trial hydroxyzine prn for anxiety/insomnia.  Notify if no improvement in sleep, consider other sleep aids.    6. Preventative health care  Comments:  ok to cont zinc anc MV  declines immunizations  PSA wnl 3/2020  labs collected today, fasting, will notify results.   Orders:  -     Hepatitis C Antibody    Other orders  -     hydrOXYzine (ATARAX) 25 MG tablet; Take 1 tablet by mouth At Night As Needed for Anxiety.  Dispense: 40 tablet; Refill: 0    Will call lab results to patient  Return in about 6 months (around 3/15/2021) for HTN, DM, GERD.

## 2020-09-16 LAB
ALBUMIN SERPL-MCNC: 4.5 G/DL (ref 3.5–5.2)
ALBUMIN/GLOB SERPL: 1.7 G/DL
ALP SERPL-CCNC: 86 U/L (ref 39–117)
ALT SERPL W P-5'-P-CCNC: 26 U/L (ref 1–41)
ANION GAP SERPL CALCULATED.3IONS-SCNC: 11.9 MMOL/L (ref 5–15)
AST SERPL-CCNC: 22 U/L (ref 1–40)
BILIRUB SERPL-MCNC: 0.4 MG/DL (ref 0–1.2)
BUN SERPL-MCNC: 11 MG/DL (ref 6–20)
BUN/CREAT SERPL: 10.3 (ref 7–25)
CALCIUM SPEC-SCNC: 9.2 MG/DL (ref 8.6–10.5)
CHLORIDE SERPL-SCNC: 104 MMOL/L (ref 98–107)
CHOLEST SERPL-MCNC: 182 MG/DL (ref 0–200)
CO2 SERPL-SCNC: 25.1 MMOL/L (ref 22–29)
CREAT SERPL-MCNC: 1.07 MG/DL (ref 0.76–1.27)
DEPRECATED RDW RBC AUTO: 39.4 FL (ref 37–54)
ERYTHROCYTE [DISTWIDTH] IN BLOOD BY AUTOMATED COUNT: 13.4 % (ref 12.3–15.4)
GFR SERPL CREATININE-BSD FRML MDRD: 72 ML/MIN/1.73
GLOBULIN UR ELPH-MCNC: 2.6 GM/DL
GLUCOSE SERPL-MCNC: 96 MG/DL (ref 65–99)
HCT VFR BLD AUTO: 44.6 % (ref 37.5–51)
HCV AB SER DONR QL: NORMAL
HDLC SERPL-MCNC: 37 MG/DL (ref 40–60)
HGB BLD-MCNC: 15.1 G/DL (ref 13–17.7)
LDLC SERPL CALC-MCNC: 125 MG/DL (ref 0–100)
LDLC/HDLC SERPL: 3.38 {RATIO}
MCH RBC QN AUTO: 28 PG (ref 26.6–33)
MCHC RBC AUTO-ENTMCNC: 33.9 G/DL (ref 31.5–35.7)
MCV RBC AUTO: 82.6 FL (ref 79–97)
PLATELET # BLD AUTO: 213 10*3/MM3 (ref 140–450)
PMV BLD AUTO: 11.1 FL (ref 6–12)
POTASSIUM SERPL-SCNC: 4.6 MMOL/L (ref 3.5–5.2)
PROT SERPL-MCNC: 7.1 G/DL (ref 6–8.5)
RBC # BLD AUTO: 5.4 10*6/MM3 (ref 4.14–5.8)
SODIUM SERPL-SCNC: 141 MMOL/L (ref 136–145)
TRIGL SERPL-MCNC: 99 MG/DL (ref 0–150)
TSH SERPL DL<=0.05 MIU/L-ACNC: 0.82 UIU/ML (ref 0.27–4.2)
VLDLC SERPL-MCNC: 19.8 MG/DL (ref 5–40)
WBC # BLD AUTO: 5.47 10*3/MM3 (ref 3.4–10.8)

## 2021-02-24 RX ORDER — LOSARTAN POTASSIUM 50 MG/1
TABLET ORAL
Qty: 90 TABLET | Refills: 0 | Status: SHIPPED | OUTPATIENT
Start: 2021-02-24 | End: 2021-03-24 | Stop reason: SDUPTHER

## 2021-03-24 ENCOUNTER — OFFICE VISIT (OUTPATIENT)
Dept: FAMILY MEDICINE CLINIC | Facility: CLINIC | Age: 56
End: 2021-03-24

## 2021-03-24 VITALS
HEART RATE: 93 BPM | DIASTOLIC BLOOD PRESSURE: 115 MMHG | TEMPERATURE: 97.3 F | HEIGHT: 72 IN | SYSTOLIC BLOOD PRESSURE: 172 MMHG | BODY MASS INDEX: 32.72 KG/M2 | WEIGHT: 241.6 LBS | OXYGEN SATURATION: 97 %

## 2021-03-24 DIAGNOSIS — Z12.5 PROSTATE CANCER SCREENING: ICD-10-CM

## 2021-03-24 DIAGNOSIS — K21.9 GASTROESOPHAGEAL REFLUX DISEASE WITHOUT ESOPHAGITIS: ICD-10-CM

## 2021-03-24 DIAGNOSIS — I10 ESSENTIAL HYPERTENSION: ICD-10-CM

## 2021-03-24 DIAGNOSIS — E11.9 TYPE 2 DIABETES MELLITUS WITHOUT COMPLICATION, WITHOUT LONG-TERM CURRENT USE OF INSULIN (HCC): Primary | ICD-10-CM

## 2021-03-24 DIAGNOSIS — G47.09 OTHER INSOMNIA: ICD-10-CM

## 2021-03-24 PROCEDURE — 99214 OFFICE O/P EST MOD 30 MIN: CPT | Performed by: NURSE PRACTITIONER

## 2021-03-24 PROCEDURE — 80061 LIPID PANEL: CPT | Performed by: NURSE PRACTITIONER

## 2021-03-24 PROCEDURE — 36415 COLL VENOUS BLD VENIPUNCTURE: CPT | Performed by: NURSE PRACTITIONER

## 2021-03-24 PROCEDURE — 85027 COMPLETE CBC AUTOMATED: CPT | Performed by: NURSE PRACTITIONER

## 2021-03-24 PROCEDURE — 84443 ASSAY THYROID STIM HORMONE: CPT | Performed by: NURSE PRACTITIONER

## 2021-03-24 PROCEDURE — 80053 COMPREHEN METABOLIC PANEL: CPT | Performed by: NURSE PRACTITIONER

## 2021-03-24 PROCEDURE — 82043 UR ALBUMIN QUANTITATIVE: CPT | Performed by: NURSE PRACTITIONER

## 2021-03-24 PROCEDURE — 83036 HEMOGLOBIN GLYCOSYLATED A1C: CPT | Performed by: NURSE PRACTITIONER

## 2021-03-24 PROCEDURE — G0103 PSA SCREENING: HCPCS | Performed by: NURSE PRACTITIONER

## 2021-03-24 RX ORDER — TRAZODONE HYDROCHLORIDE 50 MG/1
50 TABLET ORAL NIGHTLY
Qty: 30 TABLET | Refills: 0 | Status: SHIPPED | OUTPATIENT
Start: 2021-03-24 | End: 2021-04-26

## 2021-03-24 RX ORDER — LOSARTAN POTASSIUM 100 MG/1
100 TABLET ORAL DAILY
Qty: 90 TABLET | Refills: 1 | Status: SHIPPED | OUTPATIENT
Start: 2021-03-24 | End: 2021-09-21 | Stop reason: SDUPTHER

## 2021-03-24 NOTE — PROGRESS NOTES
Chief Complaint  Hypertension, Diabetes, and Follow-up (6 mo)    Subjective          Dhruv Levine presents to [facility] for   History of Present Illness     Diabetes mellitus type II, feels stable on meds, denies any signs/symptoms of hyper/hypoglycemia, blurry vision, polydipsia, polyuria, nocturia, and unintentional weight loss. BG running 102-112    HTN, stable on meds and takes as directed, denies chest pain, headache, shortness of air, palpitations and swelling of extremities.     Hyperlipidemia, mild elevation, the patient denies muscle aches, constipation, diarrhea, GI upset, fatigue, chest pain/pressure, exercise intolerance, dyspnea, palpitations, syncope and pedal edema.      GERD, with history of diverticulosis and Andersen's esophagus, with previous esophageal dilation, last colonoscopy per Dr. Weber, approximately 5 years ago and [employer] insurance apparently will not cover procedures.  Patient reports he is stable on OTC omeprazole, denies nausea, vomiting, constipation, abdominal pain and diarrhea     Insomnia, started more than 1 year ago, patient reports no difficulty falling asleep but does not stay asleep, mostly associated with anxiety, hydroxyzine not effective for sleep, wakes up with his mind racing.  Patient denies abdominal pain, arthralgias, chills, coughing, fever, joint swelling, myalgias, nausea, neck pain, rash, sore throat, vomiting or weakness, pt reports daytime fatigue. Treatments tried: OTC sleep aids ineffective including melatonin.          The following portions of the patient's history were reviewed and updated as appropriate: allergies, current medications, past family history, past medical history, past social history, past surgical history and problem list.    Past Medical History:   Diagnosis Date   • Abdominal pain, epigastric    • Acute left otitis media    • Andersen's esophagus    • Biceps tendonitis, right    • BPPV (benign paroxysmal  positional vertigo), left    • Bronchitis, acute    • Diabetes mellitus type II, controlled (CMS/HCC)    • Diverticulosis    • Dyspnea    • Exposure to hepatitis A    • Fatigue    • GERD (gastroesophageal reflux disease)    • Hand swelling    • HBP (high blood pressure)    • Hiatal hernia    • Insomnia    • Mass of finger    • Orthopedic aftercare    • Overweight    • Pain in right shoulder    • Persistent cough    • Sinus congestion    • Situational anxiety    • Thoracic outlet syndrome      Past Surgical History:   Procedure Laterality Date   • CHOLECYSTECTOMY     • SHOULDER ARTHROSCOPY Right 07/13/2018   • SHOULDER SURGERY Right 2011/2017     Family History   Problem Relation Age of Onset   • No Known Problems Mother    • Hypertension Father    • No Known Problems Sister    • No Known Problems Brother    • No Known Problems Daughter    • No Known Problems Son    • No Known Problems Maternal Aunt    • No Known Problems Maternal Uncle    • No Known Problems Paternal Aunt    • No Known Problems Paternal Uncle    • No Known Problems Maternal Grandmother    • No Known Problems Maternal Grandfather    • No Known Problems Paternal Grandmother    • No Known Problems Paternal Grandfather      Social History     Tobacco Use   • Smoking status: Never Smoker   • Smokeless tobacco: Never Used   Substance Use Topics   • Alcohol use: Yes       Current Outpatient Medications:   •  Blood Glucose Monitoring Suppl (ONE TOUCH ULTRA 2) w/Device kit, ONETOUCH ULTRA 2 w/Device KIT, Disp: , Rfl:   •  glucose blood (ONE TOUCH ULTRA TEST) test strip, ONETOUCH ULTRA BLUE STRP, Disp: , Rfl:   •  losartan (COZAAR) 100 MG tablet, Take 1 tablet by mouth Daily., Disp: 90 tablet, Rfl: 1  •  metFORMIN (GLUCOPHAGE) 500 MG tablet, Take 1 tablet by mouth 2 (Two) Times a Day With Meals., Disp: 180 tablet, Rfl: 1  •  omeprazole (priLOSEC) 20 MG capsule, OMEPRAZOLE 20 MG CPDR, Disp: , Rfl:   •  ONETOUCH DELICA LANCETS 33G misc, ONETOUCH DELICA LANCETS  "33G, Disp: , Rfl:   •  traZODone (DESYREL) 50 MG tablet, Take 1 tablet by mouth Every Night., Disp: 30 tablet, Rfl: 0    Objective   Vital Signs:   BP (!) 172/115 (BP Location: Left arm, Patient Position: Sitting, Cuff Size: Large Adult)   Pulse 93   Temp 97.3 °F (36.3 °C) (Infrared)   Ht 182.9 cm (72.01\")   Wt 110 kg (241 lb 9.6 oz)   SpO2 97%   BMI 32.76 kg/m²       Physical Exam  Vitals and nursing note reviewed.   Constitutional:       General: He is not in acute distress.     Appearance: He is well-developed. He is not diaphoretic.   HENT:      Head: Normocephalic and atraumatic.   Eyes:      Pupils: Pupils are equal, round, and reactive to light.   Neck:      Thyroid: No thyromegaly.   Cardiovascular:      Rate and Rhythm: Normal rate and regular rhythm.      Heart sounds: Normal heart sounds. No murmur heard.     Pulmonary:      Effort: Pulmonary effort is normal. No respiratory distress.      Breath sounds: Normal breath sounds.   Abdominal:      General: Bowel sounds are normal. There is no distension.      Palpations: Abdomen is soft.      Tenderness: There is no abdominal tenderness.   Musculoskeletal:         General: Normal range of motion.      Cervical back: Normal range of motion.   Skin:     General: Skin is warm and dry.      Findings: No erythema.   Neurological:      Mental Status: He is alert and oriented to person, place, and time.   Psychiatric:         Behavior: Behavior normal.         Thought Content: Thought content normal.         Judgment: Judgment normal.          Result Review :     No visits with results within 7 Day(s) from this visit.   Latest known visit with results is:   Office Visit on 09/15/2020   Component Date Value Ref Range Status   • Hemoglobin A1C 09/15/2020 5.9* 3.5 - 5.6 % Final   • Total Cholesterol 09/15/2020 182  0 - 200 mg/dL Final   • Triglycerides 09/15/2020 99  0 - 150 mg/dL Final   • HDL Cholesterol 09/15/2020 37* 40 - 60 mg/dL Final   • LDL Cholesterol  " 09/15/2020 125* 0 - 100 mg/dL Final   • VLDL Cholesterol 09/15/2020 19.8  5 - 40 mg/dL Final   • LDL/HDL Ratio 09/15/2020 3.38   Final   • Glucose 09/15/2020 96  65 - 99 mg/dL Final   • BUN 09/15/2020 11  6 - 20 mg/dL Final   • Creatinine 09/15/2020 1.07  0.76 - 1.27 mg/dL Final   • Sodium 09/15/2020 141  136 - 145 mmol/L Final   • Potassium 09/15/2020 4.6  3.5 - 5.2 mmol/L Final   • Chloride 09/15/2020 104  98 - 107 mmol/L Final   • CO2 09/15/2020 25.1  22.0 - 29.0 mmol/L Final   • Calcium 09/15/2020 9.2  8.6 - 10.5 mg/dL Final   • Total Protein 09/15/2020 7.1  6.0 - 8.5 g/dL Final   • Albumin 09/15/2020 4.50  3.50 - 5.20 g/dL Final   • ALT (SGPT) 09/15/2020 26  1 - 41 U/L Final   • AST (SGOT) 09/15/2020 22  1 - 40 U/L Final   • Alkaline Phosphatase 09/15/2020 86  39 - 117 U/L Final   • Total Bilirubin 09/15/2020 0.4  0.0 - 1.2 mg/dL Final   • eGFR Non African Amer 09/15/2020 72  >60 mL/min/1.73 Final   • Globulin 09/15/2020 2.6  gm/dL Final   • A/G Ratio 09/15/2020 1.7  g/dL Final   • BUN/Creatinine Ratio 09/15/2020 10.3  7.0 - 25.0 Final   • Anion Gap 09/15/2020 11.9  5.0 - 15.0 mmol/L Final   • WBC 09/15/2020 5.47  3.40 - 10.80 10*3/mm3 Final   • RBC 09/15/2020 5.40  4.14 - 5.80 10*6/mm3 Final   • Hemoglobin 09/15/2020 15.1  13.0 - 17.7 g/dL Final   • Hematocrit 09/15/2020 44.6  37.5 - 51.0 % Final   • MCV 09/15/2020 82.6  79.0 - 97.0 fL Final   • MCH 09/15/2020 28.0  26.6 - 33.0 pg Final   • MCHC 09/15/2020 33.9  31.5 - 35.7 g/dL Final   • RDW 09/15/2020 13.4  12.3 - 15.4 % Final   • RDW-SD 09/15/2020 39.4  37.0 - 54.0 fl Final   • MPV 09/15/2020 11.1  6.0 - 12.0 fL Final   • Platelets 09/15/2020 213  140 - 450 10*3/mm3 Final   • TSH 09/15/2020 0.820  0.270 - 4.200 uIU/mL Final   • Hepatitis C Ab 09/15/2020 Non-Reactive  Non-Reactive Final                       Assessment and Plan    Diagnoses and all orders for this visit:    1. Type 2 diabetes mellitus without complication, without long-term current use of  insulin (CMS/[facility]) (Primary)  Comments:  Previous A1c less than 6, continue Metformin twice daily, refilled today labs as ordered, notify patient results  Orders:  -     metFORMIN (GLUCOPHAGE) 500 MG tablet; Take 1 tablet by mouth 2 (Two) Times a Day With Meals.  Dispense: 180 tablet; Refill: 1  -     Hemoglobin A1c  -     MicroAlbumin, Urine, Random - Urine, Clean Catch    2. Essential hypertension  Comments:  BP elevated, does not check at home.  Recheck 160/100 bilaterally.  Increase losartan to 100 mg, start checking at home, notify if any issues  Orders:  -     losartan (COZAAR) 100 MG tablet; Take 1 tablet by mouth Daily.  Dispense: 90 tablet; Refill: 1  -     Lipid Panel  -     Comprehensive Metabolic Panel  -     CBC (No Diff)  -     TSH    3. Gastroesophageal reflux disease without esophagitis  Comments:  Patient with Andersen's esophagus, needs to contact I to reschedule colonoscopy/EGD has not yet d/t price and not covered by insurance.  Continue omeprazole    4. Prostate cancer screening  -     PSA Screen    5. Other insomnia  Comments:  Unchanged, labile melatonin and hydroxyzine ineffective, trial trazodone.  Notify if effective for refill  Orders:  -     traZODone (DESYREL) 50 MG tablet; Take 1 tablet by mouth Every Night.  Dispense: 30 tablet; Refill: 0      I spent 25 minutes caring for Dhruv on this date of service. This time includes time spent by me in the following activities:preparing for the visit, reviewing tests, performing a medically appropriate examination and/or evaluation , counseling and educating the patient/family/caregiver, ordering medications, tests, or procedures and documenting information in the medical record    Follow Up   Return in about 6 months (around 9/24/2021), or if symptoms worsen or fail to improve, for HTN, HLD, DM, insomnia. HTN panel prior .  Patient was given instructions and counseling regarding his condition or for health maintenance advice. Please see  specific information pulled into the AVS if appropriate.

## 2021-03-25 LAB
ALBUMIN SERPL-MCNC: 4.7 G/DL (ref 3.5–5.2)
ALBUMIN UR-MCNC: <1.2 MG/DL
ALBUMIN/GLOB SERPL: 1.9 G/DL
ALP SERPL-CCNC: 82 U/L (ref 39–117)
ALT SERPL W P-5'-P-CCNC: 38 U/L (ref 1–41)
ANION GAP SERPL CALCULATED.3IONS-SCNC: 10.6 MMOL/L (ref 5–15)
AST SERPL-CCNC: 30 U/L (ref 1–40)
BILIRUB SERPL-MCNC: 0.5 MG/DL (ref 0–1.2)
BUN SERPL-MCNC: 8 MG/DL (ref 6–20)
BUN/CREAT SERPL: 8.6 (ref 7–25)
CALCIUM SPEC-SCNC: 9.5 MG/DL (ref 8.6–10.5)
CHLORIDE SERPL-SCNC: 103 MMOL/L (ref 98–107)
CHOLEST SERPL-MCNC: 182 MG/DL (ref 0–200)
CO2 SERPL-SCNC: 27.4 MMOL/L (ref 22–29)
CREAT SERPL-MCNC: 0.93 MG/DL (ref 0.76–1.27)
DEPRECATED RDW RBC AUTO: 40.5 FL (ref 37–54)
ERYTHROCYTE [DISTWIDTH] IN BLOOD BY AUTOMATED COUNT: 13.4 % (ref 12.3–15.4)
GFR SERPL CREATININE-BSD FRML MDRD: 84 ML/MIN/1.73
GLOBULIN UR ELPH-MCNC: 2.5 GM/DL
GLUCOSE SERPL-MCNC: 110 MG/DL (ref 65–99)
HBA1C MFR BLD: 6.2 % (ref 3.5–5.6)
HCT VFR BLD AUTO: 47.1 % (ref 37.5–51)
HDLC SERPL-MCNC: 35 MG/DL (ref 40–60)
HGB BLD-MCNC: 15.6 G/DL (ref 13–17.7)
LDLC SERPL CALC-MCNC: 128 MG/DL (ref 0–100)
LDLC/HDLC SERPL: 3.61 {RATIO}
MCH RBC QN AUTO: 27.6 PG (ref 26.6–33)
MCHC RBC AUTO-ENTMCNC: 33.1 G/DL (ref 31.5–35.7)
MCV RBC AUTO: 83.2 FL (ref 79–97)
PLATELET # BLD AUTO: 222 10*3/MM3 (ref 140–450)
PMV BLD AUTO: 11 FL (ref 6–12)
POTASSIUM SERPL-SCNC: 4.2 MMOL/L (ref 3.5–5.2)
PROT SERPL-MCNC: 7.2 G/DL (ref 6–8.5)
PSA SERPL-MCNC: 1.6 NG/ML (ref 0–4)
RBC # BLD AUTO: 5.66 10*6/MM3 (ref 4.14–5.8)
SODIUM SERPL-SCNC: 141 MMOL/L (ref 136–145)
TRIGL SERPL-MCNC: 103 MG/DL (ref 0–150)
TSH SERPL DL<=0.05 MIU/L-ACNC: 1.09 UIU/ML (ref 0.27–4.2)
VLDLC SERPL-MCNC: 19 MG/DL (ref 5–40)
WBC # BLD AUTO: 6.23 10*3/MM3 (ref 3.4–10.8)

## 2021-03-25 RX ORDER — ATORVASTATIN CALCIUM 10 MG/1
10 TABLET, FILM COATED ORAL NIGHTLY
Qty: 90 TABLET | Refills: 1 | Status: SHIPPED | OUTPATIENT
Start: 2021-03-25 | End: 2021-09-21 | Stop reason: SDUPTHER

## 2021-04-26 DIAGNOSIS — G47.09 OTHER INSOMNIA: ICD-10-CM

## 2021-04-26 RX ORDER — TRAZODONE HYDROCHLORIDE 50 MG/1
50 TABLET ORAL NIGHTLY
Qty: 30 TABLET | Refills: 4 | Status: SHIPPED | OUTPATIENT
Start: 2021-04-26 | End: 2021-09-21

## 2021-08-13 ENCOUNTER — TELEPHONE (OUTPATIENT)
Dept: FAMILY MEDICINE CLINIC | Facility: CLINIC | Age: 56
End: 2021-08-13

## 2021-08-13 NOTE — TELEPHONE ENCOUNTER
PT CALLED STATING HE AND HIS FAMILY WERE EXPOSED TO COVD, THEY WENT TO GET TESTED BUT THEY WANT AN RX FOR IVERMECTIN TO BE CALLED IN.

## 2021-08-13 NOTE — TELEPHONE ENCOUNTER
Spoke with patient and let him know unfortunately this is not an FDA approved treatment for COVID and we cannot prescribe it because of that. He was advised that this is available OTC if he would like to purchase it. He voiced understanding.

## 2021-09-15 ENCOUNTER — LAB (OUTPATIENT)
Dept: FAMILY MEDICINE CLINIC | Facility: CLINIC | Age: 56
End: 2021-09-15

## 2021-09-15 DIAGNOSIS — I10 ESSENTIAL HYPERTENSION: Primary | ICD-10-CM

## 2021-09-15 DIAGNOSIS — E11.9 TYPE 2 DIABETES MELLITUS WITHOUT COMPLICATION, WITHOUT LONG-TERM CURRENT USE OF INSULIN (HCC): ICD-10-CM

## 2021-09-15 LAB — HBA1C MFR BLD: 6.2 % (ref 3.5–5.6)

## 2021-09-15 PROCEDURE — 80053 COMPREHEN METABOLIC PANEL: CPT | Performed by: NURSE PRACTITIONER

## 2021-09-15 PROCEDURE — 36415 COLL VENOUS BLD VENIPUNCTURE: CPT | Performed by: NURSE PRACTITIONER

## 2021-09-15 PROCEDURE — 83036 HEMOGLOBIN GLYCOSYLATED A1C: CPT | Performed by: NURSE PRACTITIONER

## 2021-09-15 PROCEDURE — 85027 COMPLETE CBC AUTOMATED: CPT | Performed by: NURSE PRACTITIONER

## 2021-09-15 PROCEDURE — 80061 LIPID PANEL: CPT | Performed by: NURSE PRACTITIONER

## 2021-09-15 PROCEDURE — 84443 ASSAY THYROID STIM HORMONE: CPT | Performed by: NURSE PRACTITIONER

## 2021-09-16 LAB
ALBUMIN SERPL-MCNC: 4.8 G/DL (ref 3.5–5.2)
ALBUMIN/GLOB SERPL: 2.2 G/DL
ALP SERPL-CCNC: 94 U/L (ref 39–117)
ALT SERPL W P-5'-P-CCNC: 27 U/L (ref 1–41)
ANION GAP SERPL CALCULATED.3IONS-SCNC: 13.7 MMOL/L (ref 5–15)
AST SERPL-CCNC: 18 U/L (ref 1–40)
BILIRUB SERPL-MCNC: 0.4 MG/DL (ref 0–1.2)
BUN SERPL-MCNC: 10 MG/DL (ref 6–20)
BUN/CREAT SERPL: 8.7 (ref 7–25)
CALCIUM SPEC-SCNC: 9.4 MG/DL (ref 8.6–10.5)
CHLORIDE SERPL-SCNC: 102 MMOL/L (ref 98–107)
CHOLEST SERPL-MCNC: 135 MG/DL (ref 0–200)
CO2 SERPL-SCNC: 23.3 MMOL/L (ref 22–29)
CREAT SERPL-MCNC: 1.15 MG/DL (ref 0.76–1.27)
DEPRECATED RDW RBC AUTO: 37.7 FL (ref 37–54)
ERYTHROCYTE [DISTWIDTH] IN BLOOD BY AUTOMATED COUNT: 12.6 % (ref 12.3–15.4)
GFR SERPL CREATININE-BSD FRML MDRD: 66 ML/MIN/1.73
GLOBULIN UR ELPH-MCNC: 2.2 GM/DL
GLUCOSE SERPL-MCNC: 212 MG/DL (ref 65–99)
HCT VFR BLD AUTO: 44.9 % (ref 37.5–51)
HDLC SERPL-MCNC: 32 MG/DL (ref 40–60)
HGB BLD-MCNC: 15 G/DL (ref 13–17.7)
LDLC SERPL CALC-MCNC: 81 MG/DL (ref 0–100)
LDLC/HDLC SERPL: 2.48 {RATIO}
MCH RBC QN AUTO: 28.3 PG (ref 26.6–33)
MCHC RBC AUTO-ENTMCNC: 33.4 G/DL (ref 31.5–35.7)
MCV RBC AUTO: 84.7 FL (ref 79–97)
PLATELET # BLD AUTO: 197 10*3/MM3 (ref 140–450)
PMV BLD AUTO: 11.5 FL (ref 6–12)
POTASSIUM SERPL-SCNC: 4.5 MMOL/L (ref 3.5–5.2)
PROT SERPL-MCNC: 7 G/DL (ref 6–8.5)
RBC # BLD AUTO: 5.3 10*6/MM3 (ref 4.14–5.8)
SODIUM SERPL-SCNC: 139 MMOL/L (ref 136–145)
TRIGL SERPL-MCNC: 118 MG/DL (ref 0–150)
TSH SERPL DL<=0.05 MIU/L-ACNC: 1 UIU/ML (ref 0.27–4.2)
VLDLC SERPL-MCNC: 22 MG/DL (ref 5–40)
WBC # BLD AUTO: 6.41 10*3/MM3 (ref 3.4–10.8)

## 2021-09-21 ENCOUNTER — OFFICE VISIT (OUTPATIENT)
Dept: FAMILY MEDICINE CLINIC | Facility: CLINIC | Age: 56
End: 2021-09-21

## 2021-09-21 VITALS
OXYGEN SATURATION: 97 % | HEART RATE: 85 BPM | SYSTOLIC BLOOD PRESSURE: 142 MMHG | BODY MASS INDEX: 31.83 KG/M2 | WEIGHT: 235 LBS | TEMPERATURE: 98.7 F | HEIGHT: 72 IN | DIASTOLIC BLOOD PRESSURE: 92 MMHG

## 2021-09-21 DIAGNOSIS — E78.2 MIXED HYPERLIPIDEMIA: ICD-10-CM

## 2021-09-21 DIAGNOSIS — K21.9 GASTROESOPHAGEAL REFLUX DISEASE WITHOUT ESOPHAGITIS: ICD-10-CM

## 2021-09-21 DIAGNOSIS — I10 ESSENTIAL HYPERTENSION: ICD-10-CM

## 2021-09-21 DIAGNOSIS — K22.719 BARRETT'S ESOPHAGUS WITH DYSPLASIA: ICD-10-CM

## 2021-09-21 DIAGNOSIS — G47.09 OTHER INSOMNIA: Primary | ICD-10-CM

## 2021-09-21 DIAGNOSIS — E11.9 TYPE 2 DIABETES MELLITUS WITHOUT COMPLICATION, WITHOUT LONG-TERM CURRENT USE OF INSULIN (HCC): ICD-10-CM

## 2021-09-21 PROCEDURE — 99214 OFFICE O/P EST MOD 30 MIN: CPT | Performed by: NURSE PRACTITIONER

## 2021-09-21 RX ORDER — LOSARTAN POTASSIUM 100 MG/1
100 TABLET ORAL DAILY
Qty: 90 TABLET | Refills: 1 | Status: SHIPPED | OUTPATIENT
Start: 2021-09-21 | End: 2022-03-16

## 2021-09-21 RX ORDER — OMEPRAZOLE 20 MG/1
20 CAPSULE, DELAYED RELEASE ORAL DAILY
Qty: 90 CAPSULE | Refills: 1 | Status: SHIPPED | OUTPATIENT
Start: 2021-09-21 | End: 2022-03-16

## 2021-09-21 RX ORDER — ATORVASTATIN CALCIUM 10 MG/1
10 TABLET, FILM COATED ORAL NIGHTLY
Qty: 90 TABLET | Refills: 1 | Status: SHIPPED | OUTPATIENT
Start: 2021-09-21 | End: 2022-03-16

## 2021-09-21 RX ORDER — TEMAZEPAM 30 MG/1
30 CAPSULE ORAL NIGHTLY PRN
Qty: 30 CAPSULE | Refills: 0 | Status: SHIPPED | OUTPATIENT
Start: 2021-09-21 | End: 2021-10-18

## 2021-09-21 NOTE — PROGRESS NOTES
Chief Complaint  Hypertension, Diabetes, Follow-up (6 mo), Results (labs from last week), and Sleeping Problem (pt reports that he is still waking up often)    Subjective          Dhruv Levine presents to [facility] for   History of Present Illness     HTN, stable on meds and takes as directed also uses beet extract, denies chest pain, headache, shortness of air, palpitations and swelling of extremities.     Hyperlipidemia, The patient denies muscle aches, constipation, diarrhea, GI upset, fatigue, chest pain/pressure, exercise intolerance, dyspnea, palpitations, syncope and pedal edema.      Diabetes mellitus type II, feels stable on meds, denies any signs/symptoms of hyper/hypoglycemia, blurry vision, polydipsia, polyuria, nocturia, and unintentional weight loss    Insomnia, patient on trazodone, still waking up multiple times through the night secondary to stress and anxiety, mind racing due to work.    frequent urination, stable on super beta prostate supplement and apple cider vinegar    Colonoscopy/EGD overdue, pt states will call Dr. Weber to schedule appointment.     Here to review labs      The following portions of the patient's history were reviewed and updated as appropriate: allergies, current medications, past family history, past medical history, past social history, past surgical history and problem list.    Past Medical History:   Diagnosis Date   • Abdominal pain, epigastric    • Acute left otitis media    • Andersen's esophagus    • Biceps tendonitis, right    • BPPV (benign paroxysmal positional vertigo), left    • Bronchitis, acute    • Diabetes mellitus type II, controlled (CMS/[facility])    • Diverticulosis    • Dyspnea    • Exposure to hepatitis A    • Fatigue    • GERD (gastroesophageal reflux disease)    • Hand swelling    • HBP (high blood pressure)    • Hiatal hernia    • Insomnia    • Mass of finger    • Orthopedic aftercare    • Overweight    • Pain in right  shoulder    • Persistent cough    • Sinus congestion    • Situational anxiety    • Thoracic outlet syndrome      Past Surgical History:   Procedure Laterality Date   • CHOLECYSTECTOMY     • SHOULDER ARTHROSCOPY Right 07/13/2018   • SHOULDER SURGERY Right 2011/2017     Family History   Problem Relation Age of Onset   • No Known Problems Mother    • Hypertension Father    • No Known Problems Sister    • No Known Problems Brother    • No Known Problems Daughter    • No Known Problems Son    • No Known Problems Maternal Aunt    • No Known Problems Maternal Uncle    • No Known Problems Paternal Aunt    • No Known Problems Paternal Uncle    • No Known Problems Maternal Grandmother    • No Known Problems Maternal Grandfather    • No Known Problems Paternal Grandmother    • No Known Problems Paternal Grandfather      Social History     Tobacco Use   • Smoking status: Never Smoker   • Smokeless tobacco: Never Used   Substance Use Topics   • Alcohol use: Yes       Current Outpatient Medications:   •  atorvastatin (LIPITOR) 10 MG tablet, Take 1 tablet by mouth Every Night., Disp: 90 tablet, Rfl: 1  •  Blood Glucose Monitoring Suppl (ONE TOUCH ULTRA 2) w/Device kit, ONETOUCH ULTRA 2 w/Device KIT, Disp: , Rfl:   •  glucose blood (ONE TOUCH ULTRA TEST) test strip, ONETOUCH ULTRA BLUE STRP, Disp: , Rfl:   •  losartan (COZAAR) 100 MG tablet, Take 1 tablet by mouth Daily., Disp: 90 tablet, Rfl: 1  •  metFORMIN (GLUCOPHAGE) 500 MG tablet, Take 1 tablet by mouth 2 (Two) Times a Day With Meals., Disp: 180 tablet, Rfl: 1  •  omeprazole (priLOSEC) 20 MG capsule, Take 1 capsule by mouth Daily., Disp: 90 capsule, Rfl: 1  •  ONETOUCH DELICA LANCETS 33G misc, ONETOUCH DELICA LANCETS 33G, Disp: , Rfl:   •  temazepam (RESTORIL) 30 MG capsule, Take 1 capsule by mouth At Night As Needed for Sleep or Anxiety., Disp: 30 capsule, Rfl: 0    Objective   Vital Signs:   /92 (BP Location: Left arm, Patient Position: Sitting, Cuff Size: Large  "Adult)   Pulse 85   Temp 98.7 °F (37.1 °C) (Infrared)   Ht 182.9 cm (72.01\")   Wt 107 kg (235 lb)   SpO2 97%   BMI 31.86 kg/m²       Physical Exam  Vitals and nursing note reviewed.   Constitutional:       General: He is not in acute distress.     Appearance: He is well-developed. He is not diaphoretic.   HENT:      Head: Normocephalic and atraumatic.   Eyes:      Pupils: Pupils are equal, round, and reactive to light.   Neck:      Thyroid: No thyromegaly.   Cardiovascular:      Rate and Rhythm: Normal rate and regular rhythm.      Heart sounds: Normal heart sounds. No murmur heard.     Pulmonary:      Effort: Pulmonary effort is normal. No respiratory distress.      Breath sounds: Normal breath sounds.   Abdominal:      General: Bowel sounds are normal. There is no distension.      Palpations: Abdomen is soft.      Tenderness: There is no abdominal tenderness.   Musculoskeletal:         General: Normal range of motion.      Cervical back: Normal range of motion.   Skin:     General: Skin is warm and dry.      Findings: No erythema.   Neurological:      Mental Status: He is alert and oriented to person, place, and time.   Psychiatric:         Behavior: Behavior normal.         Thought Content: Thought content normal.         Judgment: Judgment normal.          Result Review :     Lab on 09/15/2021   Component Date Value Ref Range Status   • WBC 09/15/2021 6.41  3.40 - 10.80 10*3/mm3 Final   • RBC 09/15/2021 5.30  4.14 - 5.80 10*6/mm3 Final   • Hemoglobin 09/15/2021 15.0  13.0 - 17.7 g/dL Final   • Hematocrit 09/15/2021 44.9  37.5 - 51.0 % Final   • MCV 09/15/2021 84.7  79.0 - 97.0 fL Final   • MCH 09/15/2021 28.3  26.6 - 33.0 pg Final   • MCHC 09/15/2021 33.4  31.5 - 35.7 g/dL Final   • RDW 09/15/2021 12.6  12.3 - 15.4 % Final   • RDW-SD 09/15/2021 37.7  37.0 - 54.0 fl Final   • MPV 09/15/2021 11.5  6.0 - 12.0 fL Final   • Platelets 09/15/2021 197  140 - 450 10*3/mm3 Final   • Glucose 09/15/2021 212* 65 - 99 " mg/dL Final   • BUN 09/15/2021 10  6 - 20 mg/dL Final   • Creatinine 09/15/2021 1.15  0.76 - 1.27 mg/dL Final   • Sodium 09/15/2021 139  136 - 145 mmol/L Final   • Potassium 09/15/2021 4.5  3.5 - 5.2 mmol/L Final   • Chloride 09/15/2021 102  98 - 107 mmol/L Final   • CO2 09/15/2021 23.3  22.0 - 29.0 mmol/L Final   • Calcium 09/15/2021 9.4  8.6 - 10.5 mg/dL Final   • Total Protein 09/15/2021 7.0  6.0 - 8.5 g/dL Final   • Albumin 09/15/2021 4.80  3.50 - 5.20 g/dL Final   • ALT (SGPT) 09/15/2021 27  1 - 41 U/L Final   • AST (SGOT) 09/15/2021 18  1 - 40 U/L Final   • Alkaline Phosphatase 09/15/2021 94  39 - 117 U/L Final   • Total Bilirubin 09/15/2021 0.4  0.0 - 1.2 mg/dL Final   • eGFR Non  Amer 09/15/2021 66  >60 mL/min/1.73 Final   • Globulin 09/15/2021 2.2  gm/dL Final   • A/G Ratio 09/15/2021 2.2  g/dL Final   • BUN/Creatinine Ratio 09/15/2021 8.7  7.0 - 25.0 Final   • Anion Gap 09/15/2021 13.7  5.0 - 15.0 mmol/L Final   • Total Cholesterol 09/15/2021 135  0 - 200 mg/dL Final   • Triglycerides 09/15/2021 118  0 - 150 mg/dL Final   • HDL Cholesterol 09/15/2021 32* 40 - 60 mg/dL Final   • LDL Cholesterol  09/15/2021 81  0 - 100 mg/dL Final   • VLDL Cholesterol 09/15/2021 22  5 - 40 mg/dL Final   • LDL/HDL Ratio 09/15/2021 2.48   Final   • TSH 09/15/2021 1.000  0.270 - 4.200 uIU/mL Final   • Hemoglobin A1C 09/15/2021 6.2* 3.5 - 5.6 % Final                       Assessment and Plan    Diagnoses and all orders for this visit:    1. Other insomnia (Primary)  Comments:  Discontinue trazodone, trial temazepam d/t jessica racing at night and waking up.  Notify if effective for further prescription.  Orders:  -     temazepam (RESTORIL) 30 MG capsule; Take 1 capsule by mouth At Night As Needed for Sleep or Anxiety.  Dispense: 30 capsule; Refill: 0    2. Type 2 diabetes mellitus without complication, without long-term current use of insulin (CMS/[facility])  Comments:   A1c 6.2, continue Metformin twice daily, refilled  today.  Orders:  -     metFORMIN (GLUCOPHAGE) 500 MG tablet; Take 1 tablet by mouth 2 (Two) Times a Day With Meals.  Dispense: 180 tablet; Refill: 1    3. Essential hypertension  Comments:  BP improved, continue/refill losartan to 100 mg.  Orders:  -     losartan (COZAAR) 100 MG tablet; Take 1 tablet by mouth Daily.  Dispense: 90 tablet; Refill: 1    4. Gastroesophageal reflux disease without esophagitis  Comments:  Patient to call and schedule appointment with Dr. Weber for colonoscopy/EGD.  Continue and refill omeprazole  Orders:  -     omeprazole (priLOSEC) 20 MG capsule; Take 1 capsule by mouth Daily.  Dispense: 90 capsule; Refill: 1    5. Andersen's esophagus with dysplasia    6. Mixed hyperlipidemia  Comments:  Reviewed labs, lipids stable, continue atorvastatin low-dose also for prevention 2/2 diabetes  Orders:  -     atorvastatin (LIPITOR) 10 MG tablet; Take 1 tablet by mouth Every Night.  Dispense: 90 tablet; Refill: 1        I spent 30 minutes caring for Dhruv Levine on this date of service. This time includes time spent by me in the following activities: preparing for the visit, reviewing tests, performing a medically appropriate examination and/or evaluation , counseling and educating the patient/family/caregiver, ordering medications, tests, or procedures and documenting information in the medical record        Follow Up     Return in about 6 months (around 3/21/2022) for htn, lipids, DM. HTN panel and PSA after 3/24 w/ appt following. .  Patient was given instructions and counseling regarding his condition or for health maintenance advice. Please see specific information pulled into the AVS if appropriate.      EMR Dragon transcription disclaimer:  Some of this encounter note is an electronic transcription translation of spoken language to printed text. The electronic translation of spoken language may permit erroneous, or at times, nonsensical words or phrases to be inadvertently transcribed;  Although I have reviewed the note for such errors some may still exist.

## 2021-10-16 DIAGNOSIS — G47.09 OTHER INSOMNIA: ICD-10-CM

## 2021-10-18 RX ORDER — TEMAZEPAM 30 MG/1
30 CAPSULE ORAL NIGHTLY PRN
Qty: 30 CAPSULE | Refills: 2 | Status: SHIPPED | OUTPATIENT
Start: 2021-10-18 | End: 2022-01-12

## 2022-01-11 DIAGNOSIS — G47.09 OTHER INSOMNIA: ICD-10-CM

## 2022-01-12 RX ORDER — TEMAZEPAM 30 MG/1
30 CAPSULE ORAL NIGHTLY PRN
Qty: 30 CAPSULE | Refills: 1 | Status: SHIPPED | OUTPATIENT
Start: 2022-01-12 | End: 2022-03-16

## 2022-03-15 DIAGNOSIS — K21.9 GASTROESOPHAGEAL REFLUX DISEASE WITHOUT ESOPHAGITIS: ICD-10-CM

## 2022-03-15 DIAGNOSIS — I10 ESSENTIAL HYPERTENSION: ICD-10-CM

## 2022-03-15 DIAGNOSIS — E78.2 MIXED HYPERLIPIDEMIA: ICD-10-CM

## 2022-03-15 DIAGNOSIS — G47.09 OTHER INSOMNIA: ICD-10-CM

## 2022-03-16 RX ORDER — TEMAZEPAM 30 MG/1
30 CAPSULE ORAL NIGHTLY PRN
Qty: 30 CAPSULE | Refills: 0 | Status: SHIPPED | OUTPATIENT
Start: 2022-03-16

## 2022-03-16 RX ORDER — LOSARTAN POTASSIUM 100 MG/1
100 TABLET ORAL DAILY
Qty: 90 TABLET | Refills: 0 | Status: SHIPPED | OUTPATIENT
Start: 2022-03-16 | End: 2022-04-04 | Stop reason: SDUPTHER

## 2022-03-16 RX ORDER — OMEPRAZOLE 20 MG/1
20 CAPSULE, DELAYED RELEASE ORAL DAILY
Qty: 90 CAPSULE | Refills: 0 | Status: SHIPPED | OUTPATIENT
Start: 2022-03-16 | End: 2022-04-04 | Stop reason: SDUPTHER

## 2022-03-16 RX ORDER — ATORVASTATIN CALCIUM 10 MG/1
10 TABLET, FILM COATED ORAL NIGHTLY
Qty: 90 TABLET | Refills: 0 | Status: SHIPPED | OUTPATIENT
Start: 2022-03-16 | End: 2022-04-04

## 2022-03-22 ENCOUNTER — TELEPHONE (OUTPATIENT)
Dept: FAMILY MEDICINE CLINIC | Facility: CLINIC | Age: 57
End: 2022-03-22

## 2022-03-22 ENCOUNTER — OFFICE VISIT (OUTPATIENT)
Dept: FAMILY MEDICINE CLINIC | Facility: CLINIC | Age: 57
End: 2022-03-22

## 2022-03-22 VITALS
OXYGEN SATURATION: 98 % | HEIGHT: 72 IN | BODY MASS INDEX: 29.04 KG/M2 | DIASTOLIC BLOOD PRESSURE: 92 MMHG | HEART RATE: 88 BPM | WEIGHT: 214.4 LBS | SYSTOLIC BLOOD PRESSURE: 128 MMHG

## 2022-03-22 DIAGNOSIS — R10.11 RUQ ABDOMINAL PAIN: Primary | ICD-10-CM

## 2022-03-22 DIAGNOSIS — B36.9 FUNGAL INFECTION OF SKIN: ICD-10-CM

## 2022-03-22 DIAGNOSIS — K59.04 CHRONIC IDIOPATHIC CONSTIPATION: ICD-10-CM

## 2022-03-22 DIAGNOSIS — E11.9 TYPE 2 DIABETES MELLITUS WITHOUT COMPLICATION, WITHOUT LONG-TERM CURRENT USE OF INSULIN: ICD-10-CM

## 2022-03-22 DIAGNOSIS — K57.90 DIVERTICULOSIS: ICD-10-CM

## 2022-03-22 DIAGNOSIS — K40.90 LEFT INGUINAL HERNIA: ICD-10-CM

## 2022-03-22 PROCEDURE — 99214 OFFICE O/P EST MOD 30 MIN: CPT | Performed by: NURSE PRACTITIONER

## 2022-03-22 RX ORDER — CLOTRIMAZOLE AND BETAMETHASONE DIPROPIONATE 10; .64 MG/G; MG/G
CREAM TOPICAL
Qty: 45 G | Refills: 0 | Status: SHIPPED | OUTPATIENT
Start: 2022-03-22 | End: 2022-10-11

## 2022-03-22 NOTE — TELEPHONE ENCOUNTER
Caller: Dhruv Levine    Relationship: Self    Best call back number: [phone] (M)    What is the best time to reach you: ANYTIME, ASAP    Who are you requesting to speak with (clinical staff, provider,  specific staff member): CLINICAL STAFF    Do you know the name of the person who called: DHRUV LEVINE    What was the call regarding: PATIENT STATED HE WAS SUPPOSED TO HAVE A SECOND PRESCRIPTION SENT IN TODAY FROM [facility] FOR THE FUNGAL INFECTION ON HIS LEFT HIP BUT THERE WAS ONLY THE PRESCRIPTION FOR LIN[facility] SENT INTO THE PHARMACY, PLEASE ADVISE ASAP    [id number] - [place], [place] - [address] - [phone]  - [phone] FX     Do you require a callback: YES, ASAP

## 2022-03-22 NOTE — PROGRESS NOTES
"Chief Complaint  Abdominal Pain (RUQ when touched; also feels like his stomach is tight.  He reports he started keto and IF in January.  He reports it's a tearing kind of pain.)    Subjective          Dhruv Levine presents to [facility] for   History of Present Illness     RUQ pain, described as \"hot poker\", lifted a gate recently and felt like he was stabbed.  Patient does have a history of diverticulosis, he ate strawberries 1 week ago, pain is subsiding now some.  He started keto diet in January, reports constipation but now resolved with inc fiber and drinks a lot of water.     left ing hernia/bulge, denies any pain, just bulges at times    Diabetes mellitus type II, patient went on keto diet in January and has also stopped drinking alcohol.  He has lost approximately 40 pounds.  Blood sugar running  every day, he stopped Metformin 2 months ago and sugars remain .         The following portions of the patient's history were reviewed and updated as appropriate: allergies, current medications, past family history, past medical history, past social history, past surgical history and problem list.    Past Medical History:   Diagnosis Date   • Abdominal pain, epigastric    • Acute left otitis media    • Andersen's esophagus    • Biceps tendonitis, right    • BPPV (benign paroxysmal positional vertigo), left    • Bronchitis, acute    • Diabetes mellitus type II, controlled ([facility])    • Diverticulosis    • Dyspnea    • Exposure to hepatitis A    • Fatigue    • GERD (gastroesophageal reflux disease)    • Hand swelling    • HBP (high blood pressure)    • Hiatal hernia    • Insomnia    • Mass of finger    • Orthopedic aftercare    • Overweight    • Pain in right shoulder    • Persistent cough    • Sinus congestion    • Situational anxiety    • Thoracic outlet syndrome      Past Surgical History:   Procedure Laterality Date   • CHOLECYSTECTOMY     • SHOULDER ARTHROSCOPY Right " 07/13/2018   • SHOULDER SURGERY Right 2011/2017     Family History   Problem Relation Age of Onset   • No Known Problems Mother    • Hypertension Father    • No Known Problems Sister    • No Known Problems Brother    • No Known Problems Daughter    • No Known Problems Son    • No Known Problems Maternal Aunt    • No Known Problems Maternal Uncle    • No Known Problems Paternal Aunt    • No Known Problems Paternal Uncle    • No Known Problems Maternal Grandmother    • No Known Problems Maternal Grandfather    • No Known Problems Paternal Grandmother    • No Known Problems Paternal Grandfather      Social History     Tobacco Use   • Smoking status: Never Smoker   • Smokeless tobacco: Never Used   Substance Use Topics   • Alcohol use: Yes       Current Outpatient Medications:   •  Blood Glucose Monitoring Suppl (ONE TOUCH ULTRA 2) w/Device kit, ONETOUCH ULTRA 2 w/Device KIT, Disp: , Rfl:   •  glucose blood test strip, ONETOUCH ULTRA BLUE STRP, Disp: , Rfl:   •  losartan (COZAAR) 100 MG tablet, TAKE 1 TABLET BY MOUTH DAILY., Disp: 90 tablet, Rfl: 0  •  omeprazole (priLOSEC) 20 MG capsule, TAKE 1 CAPSULE BY MOUTH DAILY., Disp: 90 capsule, Rfl: 0  •  ONETOUCH DELICA LANCETS 33G misc, ONETOUCH DELICA LANCETS 33G, Disp: , Rfl:   •  temazepam (RESTORIL) 30 MG capsule, TAKE 1 CAPSULE BY MOUTH AT NIGHT AS NEEDED FOR SLEEP OR ANXIETY., Disp: 30 capsule, Rfl: 0  •  atorvastatin (LIPITOR) 10 MG tablet, TAKE 1 TABLET BY MOUTH EVERY NIGHT., Disp: 90 tablet, Rfl: 0  •  clotrimazole-betamethasone (Lotrisone) 1-0.05 % cream, Apply twice daily to the affected left hip region, Disp: 45 g, Rfl: 0  •  linaclotide (Linzess) 72 MCG capsule capsule, Take 1 capsule by mouth Every Morning Before Breakfast., Disp: 30 capsule, Rfl: 0  •  metFORMIN (GLUCOPHAGE) 500 MG tablet, Take 1 tablet by mouth 2 (Two) Times a Day With Meals., Disp: 180 tablet, Rfl: 1    Objective   Vital Signs:   /92 (BP Location: Left arm, Patient Position: Sitting,  "Cuff Size: Adult)   Pulse 88   Ht 182.9 cm (72.01\")   Wt 97.3 kg (214 lb 6.4 oz)   SpO2 98%   BMI 29.07 kg/m²       Physical Exam  Vitals and nursing note reviewed.   Constitutional:       General: He is not in acute distress.     Appearance: He is well-developed. He is not diaphoretic.   HENT:      Head: Normocephalic and atraumatic.   Eyes:      Pupils: Pupils are equal, round, and reactive to light.   Neck:      Thyroid: No thyromegaly.   Cardiovascular:      Rate and Rhythm: Normal rate and regular rhythm.      Heart sounds: Normal heart sounds. No murmur heard.  Pulmonary:      Effort: Pulmonary effort is normal. No respiratory distress.      Breath sounds: Normal breath sounds.   Abdominal:      General: Bowel sounds are normal. There is no distension.      Palpations: Abdomen is soft.      Tenderness: There is abdominal tenderness (RUQ ttp, no mass, rebound or guarding).      Hernia: A hernia (Left inguinal, nontender) is present.   Musculoskeletal:         General: Normal range of motion.      Cervical back: Normal range of motion.   Skin:     General: Skin is warm and dry.      Findings: Rash (L hip discoloration with mild erythema and pruritus) present. No erythema.   Neurological:      Mental Status: He is alert and oriented to person, place, and time.   Psychiatric:         Behavior: Behavior normal.         Thought Content: Thought content normal.         Judgment: Judgment normal.          Result Review :     No visits with results within 7 Day(s) from this visit.   Latest known visit with results is:   Lab on 09/15/2021   Component Date Value Ref Range Status   • WBC 09/15/2021 6.41  3.40 - 10.80 10*3/mm3 Final   • RBC 09/15/2021 5.30  4.14 - 5.80 10*6/mm3 Final   • Hemoglobin 09/15/2021 15.0  13.0 - 17.7 g/dL Final   • Hematocrit 09/15/2021 44.9  37.5 - 51.0 % Final   • MCV 09/15/2021 84.7  79.0 - 97.0 fL Final   • MCH 09/15/2021 28.3  26.6 - 33.0 pg Final   • MCHC 09/15/2021 33.4  31.5 - 35.7 " g/dL Final   • RDW 09/15/2021 12.6  12.3 - 15.4 % Final   • RDW-SD 09/15/2021 37.7  37.0 - 54.0 fl Final   • MPV 09/15/2021 11.5  6.0 - 12.0 fL Final   • Platelets 09/15/2021 197  140 - 450 10*3/mm3 Final   • Glucose 09/15/2021 212 (A) 65 - 99 mg/dL Final   • BUN 09/15/2021 10  6 - 20 mg/dL Final   • Creatinine 09/15/2021 1.15  0.76 - 1.27 mg/dL Final   • Sodium 09/15/2021 139  136 - 145 mmol/L Final   • Potassium 09/15/2021 4.5  3.5 - 5.2 mmol/L Final   • Chloride 09/15/2021 102  98 - 107 mmol/L Final   • CO2 09/15/2021 23.3  22.0 - 29.0 mmol/L Final   • Calcium 09/15/2021 9.4  8.6 - 10.5 mg/dL Final   • Total Protein 09/15/2021 7.0  6.0 - 8.5 g/dL Final   • Albumin 09/15/2021 4.80  3.50 - 5.20 g/dL Final   • ALT (SGPT) 09/15/2021 27  1 - 41 U/L Final   • AST (SGOT) 09/15/2021 18  1 - 40 U/L Final   • Alkaline Phosphatase 09/15/2021 94  39 - 117 U/L Final   • Total Bilirubin 09/15/2021 0.4  0.0 - 1.2 mg/dL Final   • eGFR Non  Amer 09/15/2021 66  >60 mL/min/1.73 Final   • Globulin 09/15/2021 2.2  gm/dL Final   • A/G Ratio 09/15/2021 2.2  g/dL Final   • BUN/Creatinine Ratio 09/15/2021 8.7  7.0 - 25.0 Final   • Anion Gap 09/15/2021 13.7  5.0 - 15.0 mmol/L Final   • Total Cholesterol 09/15/2021 135  0 - 200 mg/dL Final   • Triglycerides 09/15/2021 118  0 - 150 mg/dL Final   • HDL Cholesterol 09/15/2021 32 (A) 40 - 60 mg/dL Final   • LDL Cholesterol  09/15/2021 81  0 - 100 mg/dL Final   • VLDL Cholesterol 09/15/2021 22  5 - 40 mg/dL Final   • LDL/HDL Ratio 09/15/2021 2.48   Final   • TSH 09/15/2021 1.000  0.270 - 4.200 uIU/mL Final   • Hemoglobin A1C 09/15/2021 6.2 (A) 3.5 - 5.6 % Final                       Assessment and Plan    Diagnoses and all orders for this visit:    1. RUQ abdominal pain (Primary)  Comments:  Symptoms are somewhat improved, recommend check KUB if s/s persist or worsen, inc fiber, no seeds/nuts/kernels, increase fiber  Orders:  -     XR Abdomen KUB; Future    2. Chronic idiopathic  constipation  Comments:  Trial Linzess 72 mcg, sample provided  Orders:  -     XR Abdomen KUB; Future    3. Diverticulosis  Comments:  stop seeds/nuts/kernels, inc water, fiber. get xray tomorrow if not improved.     4. Left inguinal hernia  Comments:  Monitor for now    5. Type 2 diabetes mellitus without complication, without long-term current use of insulin (HCC)  Comments:  Labs planned in 1 week with follow-up appointment to discuss    6. Fungal infection of skin  Comments:  Start Lotrisone    Other orders  -     Discontinue: linaclotide (Linzess) 72 MCG capsule capsule; Take 1 capsule by mouth Every Morning Before Breakfast.  Dispense: 4 capsule; Refill: 0  -     clotrimazole-betamethasone (Lotrisone) 1-0.05 % cream; Apply twice daily to the affected left hip region  Dispense: 45 g; Refill: 0      Praised efforts of weight loss    I spent 30 minutes caring for Dhruv Levine on this date of service. This time includes time spent by me in the following activities: preparing for the visit, reviewing tests, performing a medically appropriate examination and/or evaluation , counseling and educating the patient/family/caregiver, ordering medications, tests, or procedures and documenting information in the medical record        Follow Up     Return for Next scheduled follow up. DM panel and PSA on 3/28.  Patient was given instructions and counseling regarding his condition or for health maintenance advice. Please see specific information pulled into the AVS if appropriate.        Part of this note may be an electronic transcription/translation of spoken language to printed text using the Dragon Dictation System

## 2022-03-22 NOTE — TELEPHONE ENCOUNTER
DELETE AFTER REVIEWING: Telephone encounter to be sent to the clinical pool.    Pharmacy Name:  [facility]    Pharmacy representative name: HANG    Pharmacy representative phone number: [phone]    What medication are you calling in regards to: ELIAS    What question does the pharmacy have: MEDICATION WAS  CALLED IN 4 CAPSULES NEED CORRECT DOSAGE. HANG STATES THAT THE MEDICATION ONLY COMES IN 30 PILLS. PLEASE ADVISE.    Who is the provider that prescribed the medication: DR. JACKSON    Additional notes: N/A

## 2022-03-22 NOTE — TELEPHONE ENCOUNTER
I sent the Lotrisone to pharmacy.  Did not mean to send Linzess...sorry about that, I want him to try the Linzess first before sending Rx

## 2022-03-28 ENCOUNTER — CLINICAL SUPPORT (OUTPATIENT)
Dept: FAMILY MEDICINE CLINIC | Facility: CLINIC | Age: 57
End: 2022-03-28

## 2022-03-28 DIAGNOSIS — Z12.5 SCREENING PSA (PROSTATE SPECIFIC ANTIGEN): ICD-10-CM

## 2022-03-28 DIAGNOSIS — I10 PRIMARY HYPERTENSION: Primary | ICD-10-CM

## 2022-03-28 DIAGNOSIS — E78.2 MIXED HYPERLIPIDEMIA: ICD-10-CM

## 2022-03-28 DIAGNOSIS — E11.9 TYPE 2 DIABETES MELLITUS WITHOUT COMPLICATION, WITHOUT LONG-TERM CURRENT USE OF INSULIN: ICD-10-CM

## 2022-03-28 PROCEDURE — 36415 COLL VENOUS BLD VENIPUNCTURE: CPT | Performed by: NURSE PRACTITIONER

## 2022-03-28 PROCEDURE — 80053 COMPREHEN METABOLIC PANEL: CPT | Performed by: NURSE PRACTITIONER

## 2022-03-28 PROCEDURE — 83036 HEMOGLOBIN GLYCOSYLATED A1C: CPT | Performed by: NURSE PRACTITIONER

## 2022-03-28 PROCEDURE — G0103 PSA SCREENING: HCPCS | Performed by: NURSE PRACTITIONER

## 2022-03-28 PROCEDURE — 80061 LIPID PANEL: CPT | Performed by: NURSE PRACTITIONER

## 2022-03-28 PROCEDURE — 85027 COMPLETE CBC AUTOMATED: CPT | Performed by: NURSE PRACTITIONER

## 2022-03-29 LAB
ALBUMIN SERPL-MCNC: 4.7 G/DL (ref 3.5–5.2)
ALBUMIN/GLOB SERPL: 2 G/DL
ALP SERPL-CCNC: 93 U/L (ref 39–117)
ALT SERPL W P-5'-P-CCNC: 20 U/L (ref 1–41)
ANION GAP SERPL CALCULATED.3IONS-SCNC: 11 MMOL/L (ref 5–15)
AST SERPL-CCNC: 17 U/L (ref 1–40)
BILIRUB SERPL-MCNC: 0.6 MG/DL (ref 0–1.2)
BUN SERPL-MCNC: 11 MG/DL (ref 6–20)
BUN/CREAT SERPL: 13.3 (ref 7–25)
CALCIUM SPEC-SCNC: 9.3 MG/DL (ref 8.6–10.5)
CHLORIDE SERPL-SCNC: 103 MMOL/L (ref 98–107)
CHOLEST SERPL-MCNC: 160 MG/DL (ref 0–200)
CO2 SERPL-SCNC: 27 MMOL/L (ref 22–29)
CREAT SERPL-MCNC: 0.83 MG/DL (ref 0.76–1.27)
DEPRECATED RDW RBC AUTO: 42.8 FL (ref 37–54)
EGFRCR SERPLBLD CKD-EPI 2021: 102.1 ML/MIN/1.73
ERYTHROCYTE [DISTWIDTH] IN BLOOD BY AUTOMATED COUNT: 13 % (ref 12.3–15.4)
GLOBULIN UR ELPH-MCNC: 2.3 GM/DL
GLUCOSE SERPL-MCNC: 122 MG/DL (ref 65–99)
HBA1C MFR BLD: 5.6 % (ref 3.5–5.6)
HCT VFR BLD AUTO: 46.5 % (ref 37.5–51)
HDLC SERPL-MCNC: 34 MG/DL (ref 40–60)
HGB BLD-MCNC: 15 G/DL (ref 13–17.7)
LDLC SERPL CALC-MCNC: 112 MG/DL (ref 0–100)
LDLC/HDLC SERPL: 3.29 {RATIO}
MCH RBC QN AUTO: 28.5 PG (ref 26.6–33)
MCHC RBC AUTO-ENTMCNC: 32.3 G/DL (ref 31.5–35.7)
MCV RBC AUTO: 88.4 FL (ref 79–97)
PLATELET # BLD AUTO: 203 10*3/MM3 (ref 140–450)
PMV BLD AUTO: 11.3 FL (ref 6–12)
POTASSIUM SERPL-SCNC: 4.2 MMOL/L (ref 3.5–5.2)
PROT SERPL-MCNC: 7 G/DL (ref 6–8.5)
PSA SERPL-MCNC: 1.96 NG/ML (ref 0–4)
RBC # BLD AUTO: 5.26 10*6/MM3 (ref 4.14–5.8)
SODIUM SERPL-SCNC: 141 MMOL/L (ref 136–145)
TRIGL SERPL-MCNC: 71 MG/DL (ref 0–150)
VLDLC SERPL-MCNC: 14 MG/DL (ref 5–40)
WBC NRBC COR # BLD: 6.09 10*3/MM3 (ref 3.4–10.8)

## 2022-04-04 ENCOUNTER — OFFICE VISIT (OUTPATIENT)
Dept: FAMILY MEDICINE CLINIC | Facility: CLINIC | Age: 57
End: 2022-04-04

## 2022-04-04 VITALS
BODY MASS INDEX: 28.33 KG/M2 | SYSTOLIC BLOOD PRESSURE: 153 MMHG | WEIGHT: 209.2 LBS | DIASTOLIC BLOOD PRESSURE: 88 MMHG | OXYGEN SATURATION: 98 % | HEIGHT: 72 IN | HEART RATE: 86 BPM

## 2022-04-04 DIAGNOSIS — E11.9 TYPE 2 DIABETES MELLITUS WITHOUT COMPLICATION, WITHOUT LONG-TERM CURRENT USE OF INSULIN: Primary | ICD-10-CM

## 2022-04-04 DIAGNOSIS — K21.9 GASTROESOPHAGEAL REFLUX DISEASE WITHOUT ESOPHAGITIS: ICD-10-CM

## 2022-04-04 DIAGNOSIS — K59.04 CHRONIC IDIOPATHIC CONSTIPATION: ICD-10-CM

## 2022-04-04 DIAGNOSIS — E78.2 MIXED HYPERLIPIDEMIA: ICD-10-CM

## 2022-04-04 DIAGNOSIS — I10 ESSENTIAL HYPERTENSION: ICD-10-CM

## 2022-04-04 PROCEDURE — 99214 OFFICE O/P EST MOD 30 MIN: CPT | Performed by: NURSE PRACTITIONER

## 2022-04-04 RX ORDER — LOSARTAN POTASSIUM 100 MG/1
100 TABLET ORAL DAILY
Qty: 90 TABLET | Refills: 1 | Status: SHIPPED | OUTPATIENT
Start: 2022-04-04 | End: 2022-10-10 | Stop reason: SDUPTHER

## 2022-04-04 RX ORDER — OMEPRAZOLE 20 MG/1
20 CAPSULE, DELAYED RELEASE ORAL DAILY
Qty: 90 CAPSULE | Refills: 1 | Status: SHIPPED | OUTPATIENT
Start: 2022-04-04 | End: 2022-10-10 | Stop reason: SDUPTHER

## 2022-04-04 RX ORDER — AMLODIPINE BESYLATE 5 MG/1
5 TABLET ORAL DAILY
Qty: 90 TABLET | Refills: 1 | Status: SHIPPED | OUTPATIENT
Start: 2022-04-04 | End: 2022-10-10

## 2022-04-04 NOTE — PROGRESS NOTES
"Chief Complaint  Hypertension, Hyperlipidemia, Diabetes, Follow-up (6 mo), Results (Labs 3/28/22), and Abdominal Pain (Still expc pain)    Subjective          Dhruv Levine presents to [facility] for   History of Present Illness     Hyperlipidemia, stopped atorvastatin once started keto, losing weight and eating better. Myalgias resolved. But has R sided abd pain, xray showed constipation. The patient denies muscle aches, diarrhea, GI upset, fatigue, chest pain/pressure, exercise intolerance, dyspnea, palpitations, syncope and pedal edema.      Diabetes mellitus type II, stopped metformin 3mo ago, denies any signs/symptoms of hyper/hypoglycemia, blurry vision, polydipsia, polyuria, nocturia, and unintentional weight loss    HTN, \"always slightly elevated outside of office\", takes losartan daily as directed, denies chest pain, headache, shortness of air, palpitations and swelling of extremities.     R sided abd pain, xray showed constipation. linzess increased to 145 mcg and effective, ran out of sample. Has not sched appt with gastro, Dr. Weber.     Insomnia, using restoril prn.         The following portions of the patient's history were reviewed and updated as appropriate: allergies, current medications, past family history, past medical history, past social history, past surgical history and problem list.    Past Medical History:   Diagnosis Date   • Abdominal pain, epigastric    • Acute left otitis media    • Andersen's esophagus    • Biceps tendonitis, right    • BPPV (benign paroxysmal positional vertigo), left    • Bronchitis, acute    • Diabetes mellitus type II, controlled (HCC)    • Diverticulosis    • Dyspnea    • Exposure to hepatitis A    • Fatigue    • GERD (gastroesophageal reflux disease)    • Hand swelling    • HBP (high blood pressure)    • Hiatal hernia    • Insomnia    • Mass of finger    • Orthopedic aftercare    • Overweight    • Pain in right shoulder    • " Persistent cough    • Sinus congestion    • Situational anxiety    • Thoracic outlet syndrome      Past Surgical History:   Procedure Laterality Date   • CHOLECYSTECTOMY     • SHOULDER ARTHROSCOPY Right 07/13/2018   • SHOULDER SURGERY Right 2011/2017     Family History   Problem Relation Age of Onset   • No Known Problems Mother    • Hypertension Father    • No Known Problems Sister    • No Known Problems Brother    • No Known Problems Daughter    • No Known Problems Son    • No Known Problems Maternal Aunt    • No Known Problems Maternal Uncle    • No Known Problems Paternal Aunt    • No Known Problems Paternal Uncle    • No Known Problems Maternal Grandmother    • No Known Problems Maternal Grandfather    • No Known Problems Paternal Grandmother    • No Known Problems Paternal Grandfather      Social History     Tobacco Use   • Smoking status: Never Smoker   • Smokeless tobacco: Never Used   Substance Use Topics   • Alcohol use: Yes       Current Outpatient Medications:   •  Blood Glucose Monitoring Suppl (ONE TOUCH ULTRA 2) w/Device kit, ONETOUCH ULTRA 2 w/Device KIT, Disp: , Rfl:   •  clotrimazole-betamethasone (Lotrisone) 1-0.05 % cream, Apply twice daily to the affected left hip region, Disp: 45 g, Rfl: 0  •  glucose blood test strip, ONETOUCH ULTRA BLUE STRP, Disp: , Rfl:   •  linaclotide (Linzess) 145 MCG capsule capsule, Take 1 capsule by mouth Every Morning Before Breakfast., Disp: 30 capsule, Rfl: 0  •  losartan (COZAAR) 100 MG tablet, Take 1 tablet by mouth Daily., Disp: 90 tablet, Rfl: 1  •  omeprazole (priLOSEC) 20 MG capsule, Take 1 capsule by mouth Daily., Disp: 90 capsule, Rfl: 1  •  ONETOUCH DELICA LANCETS 33G mis, ONETOUCH DELICA LANCETS 33G, Disp: , Rfl:   •  temazepam (RESTORIL) 30 MG capsule, TAKE 1 CAPSULE BY MOUTH AT NIGHT AS NEEDED FOR SLEEP OR ANXIETY., Disp: 30 capsule, Rfl: 0  •  amLODIPine (NORVASC) 5 MG tablet, Take 1 tablet by mouth Daily., Disp: 90 tablet, Rfl: 1    Objective   Vital  "Signs:   /88 (BP Location: Left arm, Patient Position: Sitting, Cuff Size: Adult)   Pulse 86   Ht 182.9 cm (72.01\")   Wt 94.9 kg (209 lb 3.2 oz)   SpO2 98%   BMI 28.37 kg/m²       Physical Exam  Vitals and nursing note reviewed.   Constitutional:       General: He is not in acute distress.     Appearance: He is well-developed. He is not diaphoretic.   HENT:      Head: Normocephalic and atraumatic.   Eyes:      Pupils: Pupils are equal, round, and reactive to light.   Neck:      Thyroid: No thyromegaly.   Cardiovascular:      Rate and Rhythm: Normal rate and regular rhythm.      Heart sounds: Normal heart sounds. No murmur heard.  Pulmonary:      Effort: Pulmonary effort is normal. No respiratory distress.      Breath sounds: Normal breath sounds.   Abdominal:      General: Bowel sounds are normal. There is no distension.      Palpations: Abdomen is soft.      Tenderness: There is abdominal tenderness (RLQ mild ttp, +BS X4 quad, hypoactive. ).   Musculoskeletal:         General: Normal range of motion.      Cervical back: Normal range of motion.   Skin:     General: Skin is warm and dry.      Findings: No erythema.   Neurological:      Mental Status: He is alert and oriented to person, place, and time.   Psychiatric:         Behavior: Behavior normal.         Thought Content: Thought content normal.         Judgment: Judgment normal.          Result Review :     No visits with results within 7 Day(s) from this visit.   Latest known visit with results is:   Appointment on 03/28/2022   Component Date Value Ref Range Status   • WBC 03/28/2022 6.09  3.40 - 10.80 10*3/mm3 Final   • RBC 03/28/2022 5.26  4.14 - 5.80 10*6/mm3 Final   • Hemoglobin 03/28/2022 15.0  13.0 - 17.7 g/dL Final   • Hematocrit 03/28/2022 46.5  37.5 - 51.0 % Final   • MCV 03/28/2022 88.4  79.0 - 97.0 fL Final   • MCH 03/28/2022 28.5  26.6 - 33.0 pg Final   • MCHC 03/28/2022 32.3  31.5 - 35.7 g/dL Final   • RDW 03/28/2022 13.0  12.3 - 15.4 % " Final   • RDW-SD 03/28/2022 42.8  37.0 - 54.0 fl Final   • MPV 03/28/2022 11.3  6.0 - 12.0 fL Final   • Platelets 03/28/2022 203  140 - 450 10*3/mm3 Final   • Glucose 03/28/2022 122 (A) 65 - 99 mg/dL Final   • BUN 03/28/2022 11  6 - 20 mg/dL Final   • Creatinine 03/28/2022 0.83  0.76 - 1.27 mg/dL Final   • Sodium 03/28/2022 141  136 - 145 mmol/L Final   • Potassium 03/28/2022 4.2  3.5 - 5.2 mmol/L Final   • Chloride 03/28/2022 103  98 - 107 mmol/L Final   • CO2 03/28/2022 27.0  22.0 - 29.0 mmol/L Final   • Calcium 03/28/2022 9.3  8.6 - 10.5 mg/dL Final   • Total Protein 03/28/2022 7.0  6.0 - 8.5 g/dL Final   • Albumin 03/28/2022 4.70  3.50 - 5.20 g/dL Final   • ALT (SGPT) 03/28/2022 20  1 - 41 U/L Final   • AST (SGOT) 03/28/2022 17  1 - 40 U/L Final   • Alkaline Phosphatase 03/28/2022 93  39 - 117 U/L Final   • Total Bilirubin 03/28/2022 0.6  0.0 - 1.2 mg/dL Final   • Globulin 03/28/2022 2.3  gm/dL Final   • A/G Ratio 03/28/2022 2.0  g/dL Final   • BUN/Creatinine Ratio 03/28/2022 13.3  7.0 - 25.0 Final   • Anion Gap 03/28/2022 11.0  5.0 - 15.0 mmol/L Final   • eGFR 03/28/2022 102.1  >60.0 mL/min/1.73 Final    National Kidney Foundation and American Society of Nephrology (ASN) Task Force recommended calculation based on the Chronic Kidney Disease Epidemiology Collaboration (CKD-EPI) equation refit without adjustment for race.   • Total Cholesterol 03/28/2022 160  0 - 200 mg/dL Final   • Triglycerides 03/28/2022 71  0 - 150 mg/dL Final   • HDL Cholesterol 03/28/2022 34 (A) 40 - 60 mg/dL Final   • LDL Cholesterol  03/28/2022 112 (A) 0 - 100 mg/dL Final   • VLDL Cholesterol 03/28/2022 14  5 - 40 mg/dL Final   • LDL/HDL Ratio 03/28/2022 3.29   Final   • Hemoglobin A1C 03/28/2022 5.6  3.5 - 5.6 % Final   • PSA 03/28/2022 1.960  0.000 - 4.000 ng/mL Final                       Assessment and Plan    Diagnoses and all orders for this visit:    1. Type 2 diabetes mellitus without complication, without long-term current use of  insulin (HCC) (Primary)  Comments:  Hemoglobin A1c stable at 5.6, okay to remain off of Metformin. recheck DM panel 6mo.     2. Gastroesophageal reflux disease without esophagitis  Comments:  Patient to call and schedule appointment with Dr. Weber for colonoscopy/EGD.  Continue and refill omeprazole  Orders:  -     omeprazole (priLOSEC) 20 MG capsule; Take 1 capsule by mouth Daily.  Dispense: 90 capsule; Refill: 1    3. Essential hypertension  Comments:  BP remains slightly elevated, continue/refill losartan 100 mg, add amlodipine  Orders:  -     losartan (COZAAR) 100 MG tablet; Take 1 tablet by mouth Daily.  Dispense: 90 tablet; Refill: 1    4. Mixed hyperlipidemia  Comments:  Lipids stable off of atorvastatin, d/w pt to cont DM/HHD, recheck lipids in 6 months    5. Chronic idiopathic constipation  Comments:  pt has tried and failed otc colace, metamucil.  Linzess sample effective, rx to pharmacy.     Other orders  -     linaclotide (Linzess) 145 MCG capsule capsule; Take 1 capsule by mouth Every Morning Before Breakfast.  Dispense: 30 capsule; Refill: 0  -     amLODIPine (NORVASC) 5 MG tablet; Take 1 tablet by mouth Daily.  Dispense: 90 tablet; Refill: 1      -Patient on keto diet, also recommend increasing water intake and fiber in the diet.   -Reviewed labs with patient, all essentially stable/improved except lipids slightly elevated LDL.  -Patient to call and check price of colonoscopy with [facility], he has patient packet provided at previous visit    I spent 30 minutes caring for Dhruv Levine on this date of service. This time includes time spent by me in the following activities: preparing for the visit, reviewing tests, performing a medically appropriate examination and/or evaluation , counseling and educating the patient/family/caregiver, ordering medications, tests, or procedures and documenting information in the medical record        Follow Up     Return in about 6 months (around 10/4/2022) for  Recheck.  Patient was given instructions and counseling regarding his condition or for health maintenance advice. Please see specific information pulled into the AVS if appropriate.        Part of this note may be an electronic transcription/translation of spoken language to printed text using the Dragon Dictation System

## 2022-09-12 ENCOUNTER — OFFICE VISIT (OUTPATIENT)
Dept: FAMILY MEDICINE CLINIC | Facility: CLINIC | Age: 57
End: 2022-09-12

## 2022-09-12 VITALS
DIASTOLIC BLOOD PRESSURE: 88 MMHG | HEIGHT: 72 IN | WEIGHT: 202 LBS | OXYGEN SATURATION: 99 % | SYSTOLIC BLOOD PRESSURE: 142 MMHG | HEART RATE: 78 BPM | BODY MASS INDEX: 27.36 KG/M2

## 2022-09-12 DIAGNOSIS — K40.90 LEFT INGUINAL HERNIA: Primary | ICD-10-CM

## 2022-09-12 PROCEDURE — 99213 OFFICE O/P EST LOW 20 MIN: CPT | Performed by: NURSE PRACTITIONER

## 2022-09-12 NOTE — ASSESSMENT & PLAN NOTE
1.  Refer to general surgery to discuss treatment options  2.  Avoid heavy lifting in the meantime  3.  Call with new or worsening symptoms

## 2022-09-12 NOTE — PROGRESS NOTES
"Chief Complaint  Hernia (Possible hernia in groin area has noticed for the last few months )    Subjective    {Problem List  Visit Diagnosis   Encounters  Notes  Medications  Labs  Result Review Imaging  Media :23}    Dhruv Levine presents to [facility]  History of Present Illness    Patient presents with concerns of left sided inguinal hernia. He reports noticing a bulge since April.  Patient reports when he first noticed, the bulge would come and go.  Now, it is always present and is causing pain.  Patient denies any heavy lifting.    Objective   Vital Signs:  /88 (BP Location: Left arm, Patient Position: Sitting, Cuff Size: Adult)   Pulse 78   Ht 182.9 cm (72\")   Wt 91.6 kg (202 lb)   SpO2 99%   BMI 27.40 kg/m²   Estimated body mass index is 27.4 kg/m² as calculated from the following:    Height as of this encounter: 182.9 cm (72\").    Weight as of this encounter: 91.6 kg (202 lb).          Physical Exam  Constitutional:       Appearance: Normal appearance.   HENT:      Head: Normocephalic.   Cardiovascular:      Rate and Rhythm: Normal rate and regular rhythm.   Pulmonary:      Effort: Pulmonary effort is normal.      Breath sounds: Normal breath sounds.   Abdominal:      General: Abdomen is flat. Bowel sounds are normal.      Palpations: Abdomen is soft.      Hernia: A hernia is present. Hernia is present in the left inguinal area.   Musculoskeletal:         General: Normal range of motion.      Cervical back: Neck supple.      Right lower leg: No edema.      Left lower leg: No edema.   Skin:     General: Skin is warm and dry.   Neurological:      Mental Status: He is alert and oriented to person, place, and time.      Gait: Gait is intact.   Psychiatric:         Attention and Perception: Attention normal.         Mood and Affect: Mood normal.         Speech: Speech normal.        Result Review :                Assessment and Plan   Diagnoses and all orders " for this visit:    1. Left inguinal hernia (Primary)  Assessment & Plan:  1.  Refer to general surgery to discuss treatment options  2.  Avoid heavy lifting in the meantime  3.  Call with new or worsening symptoms    Orders:  -     Ambulatory Referral to General Surgery         I spent 20 minutes caring for Dhruv on this date of service. This time includes time spent by me in the following activities:preparing for the visit, obtaining and/or reviewing a separately obtained history, performing a medically appropriate examination and/or evaluation , counseling and educating the patient/family/caregiver, referring and communicating with other health care professionals , documenting information in the medical record and care coordination  Follow Up   Return if symptoms worsen or fail to improve.  Patient was given instructions and counseling regarding his condition or for health maintenance advice. Please see specific information pulled into the AVS if appropriate.

## 2022-10-10 ENCOUNTER — TELEPHONE (OUTPATIENT)
Dept: FAMILY MEDICINE CLINIC | Facility: CLINIC | Age: 57
End: 2022-10-10

## 2022-10-10 ENCOUNTER — OFFICE VISIT (OUTPATIENT)
Dept: FAMILY MEDICINE CLINIC | Facility: CLINIC | Age: 57
End: 2022-10-10

## 2022-10-10 VITALS
BODY MASS INDEX: 27.9 KG/M2 | TEMPERATURE: 98 F | HEIGHT: 72 IN | WEIGHT: 206 LBS | DIASTOLIC BLOOD PRESSURE: 97 MMHG | OXYGEN SATURATION: 99 % | SYSTOLIC BLOOD PRESSURE: 152 MMHG | HEART RATE: 72 BPM

## 2022-10-10 DIAGNOSIS — E78.2 MIXED HYPERLIPIDEMIA: ICD-10-CM

## 2022-10-10 DIAGNOSIS — E11.9 DIET-CONTROLLED DIABETES MELLITUS: Primary | ICD-10-CM

## 2022-10-10 DIAGNOSIS — I10 ESSENTIAL HYPERTENSION: ICD-10-CM

## 2022-10-10 DIAGNOSIS — K40.90 LEFT INGUINAL HERNIA: ICD-10-CM

## 2022-10-10 DIAGNOSIS — K21.9 GASTROESOPHAGEAL REFLUX DISEASE WITHOUT ESOPHAGITIS: ICD-10-CM

## 2022-10-10 PROCEDURE — 99214 OFFICE O/P EST MOD 30 MIN: CPT | Performed by: NURSE PRACTITIONER

## 2022-10-10 RX ORDER — OMEPRAZOLE 20 MG/1
20 CAPSULE, DELAYED RELEASE ORAL DAILY
Qty: 90 CAPSULE | Refills: 1 | Status: SHIPPED | OUTPATIENT
Start: 2022-10-10

## 2022-10-10 RX ORDER — LOSARTAN POTASSIUM 100 MG/1
100 TABLET ORAL DAILY
Qty: 90 TABLET | Refills: 1 | Status: SHIPPED | OUTPATIENT
Start: 2022-10-10

## 2022-10-10 NOTE — PROGRESS NOTES
Chief Complaint  Chief Complaint   Patient presents with   • Hypertension   • Hyperlipidemia   • Diabetes     Pt states his sugar has been            Subjective          Dhruv Levine presents to [facility] for   History of Present Illness     HTN, patient continues losartan, he has lost approximately 50 pounds since January, is not taking amlodipine ordered last visit and reports BP mostly 130's/80's outside of office. Denies chest pain, headache, shortness of air, palpitations and swelling of extremities.     Hyperlipidemia, stopped statin due to myalgia.  He has been following keto diet, lost 50 lbs, reports his weight was 248 pounds at the highest. The patient denies constipation, diarrhea, GI upset, fatigue, chest pain/pressure, exercise intolerance, dyspnea, palpitations, syncope and pedal edema.      Diet controlled Diabetes mellitus type II, reports blood sugars running , no longer on metformin.  Last hemoglobin A1c 5.6.  Denies any signs/symptoms of hyper/hypoglycemia, blurry vision, polydipsia, polyuria, nocturia, and unintentional weight loss    Chronic constipation, resolved with diet modifications and exercise, no longer using Linzess    Insomnia, secondary to stress, rarely uses Restoril-which is only minimally effective    GERD, stable on medication, denies nausea, vomiting, constipation, abdominal pain and diarrhea.    Inguinal hernia consultation scheduled tomorrow with Dr. Kris Brito for colonoscopy, concerned with cost. Pt has GSI packet at home        The following portions of the patient's history were reviewed and updated as appropriate: allergies, current medications, past family history, past medical history, past social history, past surgical history and problem list.    Past Medical History:   Diagnosis Date   • Abdominal pain, epigastric    • Acute left otitis media    • Andersen's esophagus    • Biceps tendonitis, right    • BPPV (benign  paroxysmal positional vertigo), left    • Bronchitis, acute    • Diabetes mellitus type II, controlled (HCC)    • Diverticulosis    • Dyspnea    • Exposure to hepatitis A    • Fatigue    • GERD (gastroesophageal reflux disease)    • Hand swelling    • HBP (high blood pressure)    • Hiatal hernia    • Insomnia    • Mass of finger    • Orthopedic aftercare    • Overweight    • Pain in right shoulder    • Persistent cough    • Sinus congestion    • Situational anxiety    • Thoracic outlet syndrome      Past Surgical History:   Procedure Laterality Date   • CHOLECYSTECTOMY     • SHOULDER ARTHROSCOPY Right 07/13/2018   • SHOULDER SURGERY Right 2011/2017     Family History   Problem Relation Age of Onset   • No Known Problems Mother    • Hypertension Father    • No Known Problems Sister    • No Known Problems Brother    • No Known Problems Daughter    • No Known Problems Son    • No Known Problems Maternal Aunt    • No Known Problems Maternal Uncle    • No Known Problems Paternal Aunt    • No Known Problems Paternal Uncle    • No Known Problems Maternal Grandmother    • No Known Problems Maternal Grandfather    • No Known Problems Paternal Grandmother    • No Known Problems Paternal Grandfather      Social History     Tobacco Use   • Smoking status: Never   • Smokeless tobacco: Never   Substance Use Topics   • Alcohol use: Yes       Current Outpatient Medications:   •  Blood Glucose Monitoring Suppl (ONE TOUCH ULTRA 2) w/Device kit, ONETOUCH ULTRA 2 w/Device KIT, Disp: , Rfl:   •  glucose blood test strip, ONETOUCH ULTRA BLUE STRP, Disp: , Rfl:   •  losartan (COZAAR) 100 MG tablet, Take 1 tablet by mouth Daily., Disp: 90 tablet, Rfl: 1  •  omeprazole (priLOSEC) 20 MG capsule, Take 1 capsule by mouth Daily., Disp: 90 capsule, Rfl: 1  •  ONETOUCH DELICA LANCETS 33G misc, ONETOUCH DELICA LANCETS 33G, Disp: , Rfl:   •  clotrimazole-betamethasone (Lotrisone) 1-0.05 % cream, Apply twice daily to the affected left hip region,  "Disp: 45 g, Rfl: 0  •  temazepam (RESTORIL) 30 MG capsule, TAKE 1 CAPSULE BY MOUTH AT NIGHT AS NEEDED FOR SLEEP OR ANXIETY., Disp: 30 capsule, Rfl: 0    Objective   Vital Signs:   /97   Pulse 72   Temp 98 °F (36.7 °C)   Ht 182.9 cm (72.01\")   Wt 93.4 kg (206 lb)   SpO2 99%   BMI 27.93 kg/m²           Physical Exam  Vitals and nursing note reviewed.   Constitutional:       General: He is not in acute distress.     Appearance: He is well-developed. He is not diaphoretic.   HENT:      Head: Normocephalic and atraumatic.   Eyes:      Pupils: Pupils are equal, round, and reactive to light.   Neck:      Thyroid: No thyromegaly.   Cardiovascular:      Rate and Rhythm: Normal rate and regular rhythm.      Heart sounds: Normal heart sounds. No murmur heard.  Pulmonary:      Effort: Pulmonary effort is normal. No respiratory distress.      Breath sounds: Normal breath sounds. No wheezing or rhonchi.   Abdominal:      General: Bowel sounds are normal. There is no distension.      Palpations: Abdomen is soft.      Tenderness: There is no abdominal tenderness.   Musculoskeletal:         General: No swelling. Normal range of motion.      Cervical back: Normal range of motion.   Skin:     General: Skin is warm and dry.      Findings: No erythema.   Neurological:      General: No focal deficit present.      Mental Status: He is alert and oriented to person, place, and time. Mental status is at baseline.   Psychiatric:         Mood and Affect: Mood normal.         Behavior: Behavior normal.         Thought Content: Thought content normal.         Judgment: Judgment normal.          Result Review :     No visits with results within 7 Day(s) from this visit.   Latest known visit with results is:   Appointment on 03/28/2022   Component Date Value Ref Range Status   • WBC 03/28/2022 6.09  3.40 - 10.80 10*3/mm3 Final   • RBC 03/28/2022 5.26  4.14 - 5.80 10*6/mm3 Final   • Hemoglobin 03/28/2022 15.0  13.0 - 17.7 g/dL Final   • " Hematocrit 03/28/2022 46.5  37.5 - 51.0 % Final   • MCV 03/28/2022 88.4  79.0 - 97.0 fL Final   • MCH 03/28/2022 28.5  26.6 - 33.0 pg Final   • MCHC 03/28/2022 32.3  31.5 - 35.7 g/dL Final   • RDW 03/28/2022 13.0  12.3 - 15.4 % Final   • RDW-SD 03/28/2022 42.8  37.0 - 54.0 fl Final   • MPV 03/28/2022 11.3  6.0 - 12.0 fL Final   • Platelets 03/28/2022 203  140 - 450 10*3/mm3 Final   • Glucose 03/28/2022 122 (H)  65 - 99 mg/dL Final   • BUN 03/28/2022 11  6 - 20 mg/dL Final   • Creatinine 03/28/2022 0.83  0.76 - 1.27 mg/dL Final   • Sodium 03/28/2022 141  136 - 145 mmol/L Final   • Potassium 03/28/2022 4.2  3.5 - 5.2 mmol/L Final   • Chloride 03/28/2022 103  98 - 107 mmol/L Final   • CO2 03/28/2022 27.0  22.0 - 29.0 mmol/L Final   • Calcium 03/28/2022 9.3  8.6 - 10.5 mg/dL Final   • Total Protein 03/28/2022 7.0  6.0 - 8.5 g/dL Final   • Albumin 03/28/2022 4.70  3.50 - 5.20 g/dL Final   • ALT (SGPT) 03/28/2022 20  1 - 41 U/L Final   • AST (SGOT) 03/28/2022 17  1 - 40 U/L Final   • Alkaline Phosphatase 03/28/2022 93  39 - 117 U/L Final   • Total Bilirubin 03/28/2022 0.6  0.0 - 1.2 mg/dL Final   • Globulin 03/28/2022 2.3  gm/dL Final   • A/G Ratio 03/28/2022 2.0  g/dL Final   • BUN/Creatinine Ratio 03/28/2022 13.3  7.0 - 25.0 Final   • Anion Gap 03/28/2022 11.0  5.0 - 15.0 mmol/L Final   • eGFR 03/28/2022 102.1  >60.0 mL/min/1.73 Final    National Kidney Foundation and American Society of Nephrology (ASN) Task Force recommended calculation based on the Chronic Kidney Disease Epidemiology Collaboration (CKD-EPI) equation refit without adjustment for race.   • Total Cholesterol 03/28/2022 160  0 - 200 mg/dL Final   • Triglycerides 03/28/2022 71  0 - 150 mg/dL Final   • HDL Cholesterol 03/28/2022 34 (L)  40 - 60 mg/dL Final   • LDL Cholesterol  03/28/2022 112 (H)  0 - 100 mg/dL Final   • VLDL Cholesterol 03/28/2022 14  5 - 40 mg/dL Final   • LDL/HDL Ratio 03/28/2022 3.29   Final   • Hemoglobin A1C 03/28/2022 5.6  3.5 - 5.6  % Final   • PSA 03/28/2022 1.960  0.000 - 4.000 ng/mL Final                  BMI is >= 25 and <30. (Overweight) The following options were offered after discussion;: exercise counseling/recommendations and nutrition counseling/recommendations           Assessment and Plan    Diagnoses and all orders for this visit:    1. Diet-controlled diabetes mellitus (HCC) (Primary)  Comments:  Praised efforts of weight loss and diet/lifestyle modifications  Hemoglobin A1c later this week  continue off metformin  Orders:  -     Hemoglobin A1c  -     MicroAlbumin, Urine, Random - Urine, Clean Catch; Future    2. Essential hypertension  Comments:  BP slightly elevated in office, stable outside of the office. continue/refill losartan 100 mg  Okay to discontinue amlodipine  Orders:  -     Comprehensive Metabolic Panel  -     CBC (No Diff)  -     losartan (COZAAR) 100 MG tablet; Take 1 tablet by mouth Daily.  Dispense: 90 tablet; Refill: 1    3. Gastroesophageal reflux disease without esophagitis  Comments:  Recommend pt to call and schedule appointment for colonoscopy/EGD.    Continue and refill omeprazole, okay to try every other day or as needed  Orders:  -     omeprazole (priLOSEC) 20 MG capsule; Take 1 capsule by mouth Daily.  Dispense: 90 capsule; Refill: 1    4. Mixed hyperlipidemia  Comments:  Praised efforts of weight loss  Continue keto/healthy heart diet  Lipids later this week fasting  Orders:  -     Lipid Panel    5. Left inguinal hernia  Comments:  Consultation with Dr. Ponce tomorrow      Patient declines flu and pneumonia vaccines  Recommend Shingrix vaccines at pharmacy      I spent 30 minutes caring for Dhruv Levine on this date of service. This time includes time spent by me in the following activities: preparing for the visit, reviewing tests, performing a medically appropriate examination and/or evaluation , counseling and educating the patient/family/caregiver, ordering medications, tests, or procedures and  documenting information in the medical record        Follow Up     Return in about 6 months (around 4/10/2023) for Annual physical HTN, DM. sched labs later this week.  Patient was given instructions and counseling regarding his condition or for health maintenance advice. Please see specific information pulled into the AVS if appropriate.        Part of this note may be an electronic transcription/translation of spoken language to printed text using the Dragon Dictation System

## 2022-10-10 NOTE — TELEPHONE ENCOUNTER
Attempted to call pt to reschedule lab appt Friday:  Per verbal left msg for pt to call and reschedule

## 2022-10-11 ENCOUNTER — OFFICE VISIT (OUTPATIENT)
Dept: SURGERY | Facility: CLINIC | Age: 57
End: 2022-10-11

## 2022-10-11 VITALS
WEIGHT: 206 LBS | DIASTOLIC BLOOD PRESSURE: 86 MMHG | HEART RATE: 82 BPM | HEIGHT: 72 IN | RESPIRATION RATE: 18 BRPM | BODY MASS INDEX: 27.9 KG/M2 | SYSTOLIC BLOOD PRESSURE: 137 MMHG | TEMPERATURE: 98.2 F | OXYGEN SATURATION: 99 %

## 2022-10-11 DIAGNOSIS — K40.90 LEFT INGUINAL HERNIA: Primary | ICD-10-CM

## 2022-10-11 PROCEDURE — 99204 OFFICE O/P NEW MOD 45 MIN: CPT | Performed by: SURGERY

## 2022-10-11 RX ORDER — SODIUM CHLORIDE 9 MG/ML
100 INJECTION, SOLUTION INTRAVENOUS CONTINUOUS
Status: CANCELLED | OUTPATIENT
Start: 2022-10-11

## 2022-10-11 RX ORDER — SODIUM CHLORIDE 0.9 % (FLUSH) 0.9 %
3-10 SYRINGE (ML) INJECTION AS NEEDED
Status: CANCELLED | OUTPATIENT
Start: 2022-10-11

## 2022-10-11 RX ORDER — MULTIPLE VITAMINS W/ MINERALS TAB 9MG-400MCG
1 TAB ORAL DAILY
COMMUNITY

## 2022-10-11 RX ORDER — SODIUM CHLORIDE 0.9 % (FLUSH) 0.9 %
3 SYRINGE (ML) INJECTION EVERY 12 HOURS SCHEDULED
Status: CANCELLED | OUTPATIENT
Start: 2022-10-11

## 2022-10-11 NOTE — PROGRESS NOTES
"Subjective   Dhruv Levine is a 57 y.o. male.   Chief Complaint   Patient presents with   • Hernia     NP Ref Ana Rodarte NP, Lt Ing Hernia      /86 (BP Location: Left arm, Patient Position: Sitting, Cuff Size: Large Adult)   Pulse 82   Temp 98.2 °F (36.8 °C) (Infrared)   Resp 18   Ht 182.9 cm (72\")   Wt 93.4 kg (206 lb)   SpO2 99%   BMI 27.94 kg/m²     HISTORY OF PRESENT ILLNESS:  57-year-old gentleman referred to me for evaluation and possible repair of a left inguinal hernia.  He says has been present several years but this year has been dealing with worsening discomfort.  He says that he has a deep discomfort that feels like he is being punched in the left groin associated with a bulge.  He typically reduces the bulge manually which does help but then it comes back.  It is worse with activities that are strenuous.  He has not had any obstructive symptoms.  Is never had repair in this area before.      Outpatient Encounter Medications as of 10/11/2022   Medication Sig Dispense Refill   • Blood Glucose Monitoring Suppl (ONE TOUCH ULTRA 2) w/Device kit ONETOUCH ULTRA 2 w/Device KIT     • glucose blood test strip ONETOUCH ULTRA BLUE STRP     • losartan (COZAAR) 100 MG tablet Take 1 tablet by mouth Daily. 90 tablet 1   • Multiple Vitamins-Minerals (ZINC PO) Take  by mouth.     • multivitamin with minerals tablet tablet Take 1 tablet by mouth Daily.     • omeprazole (priLOSEC) 20 MG capsule Take 1 capsule by mouth Daily. 90 capsule 1   • [facility]ANCETS [facility] ONETOUCH DELICA LANCETS 33G     • temazepam (RESTORIL) 30 MG capsule TAKE 1 CAPSULE BY MOUTH AT NIGHT AS NEEDED FOR SLEEP OR ANXIETY. 30 capsule 0   • [DISCONTINUED] clotrimazole-betamethasone (Lotrisone) 1-0.05 % cream Apply twice daily to the affected left hip region 45 g 0     No facility-administered encounter medications on file as of 10/11/2022.         The following portions of the patient's history were reviewed and " updated as appropriate: allergies, current medications, past family history, past medical history, past social history, past surgical history and problem list.    Review of Systems  Complete review of system has been obtained and is negative.  Objective     Physical Exam  Constitutional:       Appearance: Normal appearance.   HENT:      Head: Normocephalic and atraumatic.   Cardiovascular:      Rate and Rhythm: Normal rate.   Pulmonary:      Effort: Pulmonary effort is normal. No respiratory distress.   Abdominal:      General: There is no distension.      Palpations: Abdomen is soft.   Genitourinary:     Comments: Reducible left inguinal hernia that is worse with Valsalva no evidence of inguinal hernia in the right groin on palpation  Skin:     General: Skin is warm and dry.   Neurological:      General: No focal deficit present.      Mental Status: He is alert. Mental status is at baseline.   Psychiatric:         Mood and Affect: Mood normal.         Behavior: Behavior normal.           Assessment & Plan   Diagnoses and all orders for this visit:    1. Left inguinal hernia (Primary)  -     Case Request; Standing  -     sodium chloride 0.9 % infusion  -     sodium chloride 0.9 % flush 3 mL  -     sodium chloride 0.9 % flush 3-10 mL  -     ceFAZolin (ANCEF) 2 g in sodium chloride 0.9 % 100 mL IVPB  -     Case Request    Other orders  -     Follow Anesthesia Guidelines / Standing Orders; Future  -     Obtain Informed Consent; Future  -     Provide NPO Instructions to Patient; Future  -     Chlorhexidine Skin Prep; Future  -     Follow Anesthesia Guidelines / Standing Orders; Standing  -     Verify / Perform Chlorhexidine Skin Prep; Standing  -     Instructions on Coughing, Deep Breathing & Incentive Spirometry; Standing  -     Notify Physician - Standard; Standing  -     Insert Peripheral IV; Standing  -     Saline Lock & Maintain IV Access; Standing    Counseled him about the natural history of inguinal hernias the  nonoperative and operative management.  We talked about the steps of the operation to spitter cover the possible complications peer we talked about each approach open laparoscopic and robotic.  We talked with the risk and benefits of the use of mesh.  After our discussion about hernia surgery he understands the risk and is willing to proceed with robotic left inguinal hernia repair    This is a chronic problem with progressive symptoms and calcium out major abdominal surgery without significant risk factors for morbidity    Arian Ponce MD  10/11/2022  9:39 AM EDT    This note was created using Dragon Voice Recognition software.

## 2022-10-12 ENCOUNTER — CLINICAL SUPPORT (OUTPATIENT)
Dept: FAMILY MEDICINE CLINIC | Facility: CLINIC | Age: 57
End: 2022-10-12

## 2022-10-12 DIAGNOSIS — E11.9 DIET-CONTROLLED DIABETES MELLITUS: ICD-10-CM

## 2022-10-12 LAB — HBA1C MFR BLD: 5.6 % (ref 3.5–5.6)

## 2022-10-12 PROCEDURE — 80061 LIPID PANEL: CPT | Performed by: NURSE PRACTITIONER

## 2022-10-12 PROCEDURE — 80053 COMPREHEN METABOLIC PANEL: CPT | Performed by: NURSE PRACTITIONER

## 2022-10-12 PROCEDURE — 85027 COMPLETE CBC AUTOMATED: CPT | Performed by: NURSE PRACTITIONER

## 2022-10-12 PROCEDURE — 36415 COLL VENOUS BLD VENIPUNCTURE: CPT | Performed by: NURSE PRACTITIONER

## 2022-10-12 PROCEDURE — 83036 HEMOGLOBIN GLYCOSYLATED A1C: CPT | Performed by: NURSE PRACTITIONER

## 2022-10-12 NOTE — PROGRESS NOTES
Venipuncture Blood Specimen Collection  Venipuncture performed in the right arm by Layne Kapoor MA with good hemostasis. Patient tolerated the procedure well without complications.   10/12/22   Layne Kapoor MA  Answers for HPI/ROS submitted by the patient on 10/10/2022  What is the primary reason for your visit?: Other  Please describe your symptoms.: Blood work  Have you had these symptoms before?: No  How long have you been having these symptoms?: 1-4 days

## 2022-10-13 LAB
ALBUMIN SERPL-MCNC: 4.7 G/DL (ref 3.5–5.2)
ALBUMIN/GLOB SERPL: 2.1 G/DL
ALP SERPL-CCNC: 83 U/L (ref 39–117)
ALT SERPL W P-5'-P-CCNC: 17 U/L (ref 1–41)
ANION GAP SERPL CALCULATED.3IONS-SCNC: 8 MMOL/L (ref 5–15)
AST SERPL-CCNC: 21 U/L (ref 1–40)
BILIRUB SERPL-MCNC: 0.5 MG/DL (ref 0–1.2)
BUN SERPL-MCNC: 16 MG/DL (ref 6–20)
BUN/CREAT SERPL: 18.4 (ref 7–25)
CALCIUM SPEC-SCNC: 9.4 MG/DL (ref 8.6–10.5)
CHLORIDE SERPL-SCNC: 106 MMOL/L (ref 98–107)
CHOLEST SERPL-MCNC: 164 MG/DL (ref 0–200)
CO2 SERPL-SCNC: 28 MMOL/L (ref 22–29)
CREAT SERPL-MCNC: 0.87 MG/DL (ref 0.76–1.27)
DEPRECATED RDW RBC AUTO: 38.2 FL (ref 37–54)
EGFRCR SERPLBLD CKD-EPI 2021: 100 ML/MIN/1.73
ERYTHROCYTE [DISTWIDTH] IN BLOOD BY AUTOMATED COUNT: 12.7 % (ref 12.3–15.4)
GLOBULIN UR ELPH-MCNC: 2.2 GM/DL
GLUCOSE SERPL-MCNC: 106 MG/DL (ref 65–99)
HCT VFR BLD AUTO: 45.6 % (ref 37.5–51)
HDLC SERPL-MCNC: 39 MG/DL (ref 40–60)
HGB BLD-MCNC: 15.5 G/DL (ref 13–17.7)
LDLC SERPL CALC-MCNC: 114 MG/DL (ref 0–100)
LDLC/HDLC SERPL: 2.94 {RATIO}
MCH RBC QN AUTO: 29 PG (ref 26.6–33)
MCHC RBC AUTO-ENTMCNC: 34 G/DL (ref 31.5–35.7)
MCV RBC AUTO: 85.2 FL (ref 79–97)
PLATELET # BLD AUTO: 193 10*3/MM3 (ref 140–450)
PMV BLD AUTO: 11.3 FL (ref 6–12)
POTASSIUM SERPL-SCNC: 4.9 MMOL/L (ref 3.5–5.2)
PROT SERPL-MCNC: 6.9 G/DL (ref 6–8.5)
RBC # BLD AUTO: 5.35 10*6/MM3 (ref 4.14–5.8)
SODIUM SERPL-SCNC: 142 MMOL/L (ref 136–145)
TRIGL SERPL-MCNC: 52 MG/DL (ref 0–150)
VLDLC SERPL-MCNC: 11 MG/DL (ref 5–40)
WBC NRBC COR # BLD: 7.27 10*3/MM3 (ref 3.4–10.8)

## 2022-10-13 RX ORDER — AMLODIPINE BESYLATE 5 MG/1
TABLET ORAL
Qty: 90 TABLET | Refills: 0 | OUTPATIENT
Start: 2022-10-13

## 2022-12-05 ENCOUNTER — TELEPHONE (OUTPATIENT)
Dept: SURGERY | Facility: CLINIC | Age: 57
End: 2022-12-05

## 2022-12-05 NOTE — TELEPHONE ENCOUNTER
Caller: Dhruv Levine    Relationship to patient: Self    Best call back number: [phone]    Patient is needing: PT IS WANTING TO CANCEL HIS INGUINAL HERNIA REPAIR SURGERY WITH DR. SY ON 12/12/22.    UNABLE TO WARM TRANSFER

## 2022-12-14 NOTE — PROGRESS NOTES
Venipuncture Blood Specimen Collection  Venipuncture performed by Layne Kapoor MA with good hemostasis. Patient tolerated the procedure well without complications.   12/14/22   Layne Kapoor MA

## 2023-01-13 ENCOUNTER — TELEPHONE (OUTPATIENT)
Dept: FAMILY MEDICINE CLINIC | Facility: CLINIC | Age: 58
End: 2023-01-13
Payer: COMMERCIAL

## 2023-01-13 RX ORDER — AZITHROMYCIN 250 MG/1
TABLET, FILM COATED ORAL
Qty: 6 TABLET | Refills: 0 | Status: SHIPPED | OUTPATIENT
Start: 2023-01-13 | End: 2023-01-26

## 2023-01-13 NOTE — TELEPHONE ENCOUNTER
I will send an Z-Jesus but with the body aches, a visit to urgent care or [facility] to rule out influenza or COVID-19 would be recommended.

## 2023-01-13 NOTE — TELEPHONE ENCOUNTER
Dhruv called with symptoms of body aches, yellow drainage, nasal congestion, sore throat and ear plugged up since Tues 01/10. Requested same day appt, informed non available.Instructed to go to Urgent care, declined. Scheduled for 01/19/23 at request incase symptoms lingered.    He is requesting an antibiotic to be called into pharmacy. Please Advise    Pharmacy: [facility] in [place]

## 2023-01-19 ENCOUNTER — OFFICE VISIT (OUTPATIENT)
Dept: FAMILY MEDICINE CLINIC | Facility: CLINIC | Age: 58
End: 2023-01-19
Payer: COMMERCIAL

## 2023-01-19 VITALS — TEMPERATURE: 98.4 F | OXYGEN SATURATION: 97 % | HEART RATE: 78 BPM

## 2023-01-19 DIAGNOSIS — M77.12 LATERAL EPICONDYLITIS OF LEFT ELBOW: ICD-10-CM

## 2023-01-19 DIAGNOSIS — J01.40 ACUTE NON-RECURRENT PANSINUSITIS: Primary | ICD-10-CM

## 2023-01-19 DIAGNOSIS — J06.9 ACUTE URI: ICD-10-CM

## 2023-01-19 PROCEDURE — 99213 OFFICE O/P EST LOW 20 MIN: CPT | Performed by: NURSE PRACTITIONER

## 2023-01-19 RX ORDER — METHYLPREDNISOLONE 4 MG/1
TABLET ORAL
Qty: 21 TABLET | Refills: 0 | Status: SHIPPED | OUTPATIENT
Start: 2023-01-19 | End: 2023-01-26 | Stop reason: SDUPTHER

## 2023-01-19 RX ORDER — AMOXICILLIN AND CLAVULANATE POTASSIUM 875; 125 MG/1; MG/1
1 TABLET, FILM COATED ORAL 2 TIMES DAILY
Qty: 20 TABLET | Refills: 0 | Status: SHIPPED | OUTPATIENT
Start: 2023-01-19 | End: 2023-02-01

## 2023-01-19 NOTE — PROGRESS NOTES
Chief Complaint  Chief Complaint   Patient presents with   • URI     Pt says he started feeling bad about a week and a half ago. Symptoms improved for about a day but then got worse again. Pt had a fever last week but currently has no fever, he does report chills, body aches, congestion, pressure in ears, and pain in his middle back. He says his wife was seen at urgent care and tested negative for covid and flu- they were told she has a uri and is currently on antibiotics            Subjective          Dhruv Levine presents to [facility] for   History of Present Illness     Patient presents for same-day acute visit for symptoms as mentioned in chief complaint. He does report cough is tight, burns to take a deep breath, and has started having thick, productive sputum.  He denies shortness of air.     He also reports having a tennis injury to the left elbow many years ago, the left elbow/forearm has flared up this past week and having difficulty with range of motion      The following portions of the patient's history were reviewed and updated as appropriate: allergies, current medications, past family history, past medical history, past social history, past surgical history and problem list.    Past Medical History:   Diagnosis Date   • Abdominal pain, epigastric    • Acute left otitis media    • Andersen's esophagus    • Biceps tendonitis, right    • BPPV (benign paroxysmal positional vertigo), left    • Bronchitis, acute    • Colon polyp    • Diabetes mellitus type II, controlled ([facility])    • Diverticulitis of colon    • Diverticulosis    • Dyspnea    • Exposure to hepatitis A    • Fatigue    • GERD (gastroesophageal reflux disease)    • Hand swelling    • HBP (high blood pressure)    • Hiatal hernia    • Insomnia    • Mass of finger    • Orthopedic aftercare    • Overweight    • Pain in right shoulder    • Persistent cough    • Sinus congestion    • Situational anxiety    • Thoracic  outlet syndrome      Past Surgical History:   Procedure Laterality Date   • CHOLECYSTECTOMY     • SHOULDER ARTHROSCOPY Right 07/13/2018   • SHOULDER SURGERY Right 2011/2017     Family History   Problem Relation Age of Onset   • No Known Problems Mother    • Hypertension Father    • No Known Problems Sister    • No Known Problems Brother    • No Known Problems Daughter    • No Known Problems Son    • No Known Problems Maternal Aunt    • No Known Problems Maternal Uncle    • No Known Problems Paternal Aunt    • No Known Problems Paternal Uncle    • No Known Problems Maternal Grandmother    • No Known Problems Maternal Grandfather    • No Known Problems Paternal Grandmother    • No Known Problems Paternal Grandfather      Social History     Tobacco Use   • Smoking status: Never   • Smokeless tobacco: Never   Substance Use Topics   • Alcohol use: Yes       Current Outpatient Medications:   •  amoxicillin-clavulanate (Augmentin) 875-125 MG per tablet, Take 1 tablet by mouth 2 (Two) Times a Day., Disp: 20 tablet, Rfl: 0  •  azithromycin (Zithromax) 250 MG tablet, Take 2 tablets the first day, then 1 tablet daily for 4 days., Disp: 6 tablet, Rfl: 0  •  Blood Glucose Monitoring Suppl (ONE TOUCH ULTRA 2) w/Device kit, ONETOUCH ULTRA 2 w/Device KIT, Disp: , Rfl:   •  glucose blood test strip, ONETOUCH ULTRA BLUE STRP, Disp: , Rfl:   •  losartan (COZAAR) 100 MG tablet, Take 1 tablet by mouth Daily., Disp: 90 tablet, Rfl: 1  •  methylPREDNISolone (MEDROL) 4 MG dose pack, Take as directed on package instructions., Disp: 21 tablet, Rfl: 0  •  Multiple Vitamins-Minerals (ZINC PO), Take  by mouth., Disp: , Rfl:   •  multivitamin with minerals tablet tablet, Take 1 tablet by mouth Daily., Disp: , Rfl:   •  omeprazole (priLOSEC) 20 MG capsule, Take 1 capsule by mouth Daily., Disp: 90 capsule, Rfl: 1  •  ONETOUCH DELICA LANCETS 33G misc, ONETOUCH DELICA LANCETS 33G, Disp: , Rfl:   •  temazepam (RESTORIL) 30 MG capsule, TAKE 1 CAPSULE  BY MOUTH AT NIGHT AS NEEDED FOR SLEEP OR ANXIETY., Disp: 30 capsule, Rfl: 0    Objective   Vital Signs:   Pulse 78   Temp 98.4 °F (36.9 °C) (Temporal)   SpO2 97%           Physical Exam  Vitals and nursing note reviewed.   Constitutional:       General: He is not in acute distress.     Appearance: He is well-developed. He is ill-appearing. He is not diaphoretic.   HENT:      Head: Normocephalic and atraumatic.      Comments: Frontal/maxillary sinus tenderness     Nose: Congestion present.   Eyes:      Pupils: Pupils are equal, round, and reactive to light.   Neck:      Thyroid: No thyromegaly.   Cardiovascular:      Rate and Rhythm: Normal rate and regular rhythm.      Heart sounds: Normal heart sounds. No murmur heard.  Pulmonary:      Effort: Pulmonary effort is normal. No respiratory distress.      Breath sounds: Normal breath sounds. No wheezing or rhonchi.      Comments: Coarse throughout, deep breath elicits cough  Abdominal:      General: Bowel sounds are normal. There is no distension.      Palpations: Abdomen is soft.      Tenderness: There is no abdominal tenderness.   Musculoskeletal:         General: Tenderness (L lateral epicondyle ttp, mod bravo rom ) present. No swelling. Normal range of motion.      Cervical back: Normal range of motion.   Skin:     General: Skin is warm and dry.      Findings: No erythema.   Neurological:      Mental Status: He is alert and oriented to person, place, and time.   Psychiatric:         Behavior: Behavior normal.         Thought Content: Thought content normal.         Judgment: Judgment normal.          Result Review :     No visits with results within 7 Day(s) from this visit.   Latest known visit with results is:   Office Visit on 10/10/2022   Component Date Value Ref Range Status   • Glucose 10/12/2022 106 (H)  65 - 99 mg/dL Final   • BUN 10/12/2022 16  6 - 20 mg/dL Final   • Creatinine 10/12/2022 0.87  0.76 - 1.27 mg/dL Final   • Sodium 10/12/2022 142  136 - 145  mmol/L Final   • Potassium 10/12/2022 4.9  3.5 - 5.2 mmol/L Final   • Chloride 10/12/2022 106  98 - 107 mmol/L Final   • CO2 10/12/2022 28.0  22.0 - 29.0 mmol/L Final   • Calcium 10/12/2022 9.4  8.6 - 10.5 mg/dL Final   • Total Protein 10/12/2022 6.9  6.0 - 8.5 g/dL Final   • Albumin 10/12/2022 4.70  3.50 - 5.20 g/dL Final   • ALT (SGPT) 10/12/2022 17  1 - 41 U/L Final   • AST (SGOT) 10/12/2022 21  1 - 40 U/L Final   • Alkaline Phosphatase 10/12/2022 83  39 - 117 U/L Final   • Total Bilirubin 10/12/2022 0.5  0.0 - 1.2 mg/dL Final   • Globulin 10/12/2022 2.2  gm/dL Final   • A/G Ratio 10/12/2022 2.1  g/dL Final   • BUN/Creatinine Ratio 10/12/2022 18.4  7.0 - 25.0 Final   • Anion Gap 10/12/2022 8.0  5.0 - 15.0 mmol/L Final   • eGFR 10/12/2022 100.0  >60.0 mL/min/1.73 Final    National Kidney Foundation and American Society of Nephrology (ASN) Task Force recommended calculation based on the Chronic Kidney Disease Epidemiology Collaboration (CKD-EPI) equation refit without adjustment for race.   • WBC 10/12/2022 7.27  3.40 - 10.80 10*3/mm3 Final   • RBC 10/12/2022 5.35  4.14 - 5.80 10*6/mm3 Final   • Hemoglobin 10/12/2022 15.5  13.0 - 17.7 g/dL Final   • Hematocrit 10/12/2022 45.6  37.5 - 51.0 % Final   • MCV 10/12/2022 85.2  79.0 - 97.0 fL Final   • MCH 10/12/2022 29.0  26.6 - 33.0 pg Final   • MCHC 10/12/2022 34.0  31.5 - 35.7 g/dL Final   • RDW 10/12/2022 12.7  12.3 - 15.4 % Final   • RDW-SD 10/12/2022 38.2  37.0 - 54.0 fl Final   • MPV 10/12/2022 11.3  6.0 - 12.0 fL Final   • Platelets 10/12/2022 193  140 - 450 10*3/mm3 Final   • Hemoglobin A1C 10/12/2022 5.6  3.5 - 5.6 % Final   • Total Cholesterol 10/12/2022 164  0 - 200 mg/dL Final   • Triglycerides 10/12/2022 52  0 - 150 mg/dL Final   • HDL Cholesterol 10/12/2022 39 (L)  40 - 60 mg/dL Final   • LDL Cholesterol  10/12/2022 114 (H)  0 - 100 mg/dL Final   • VLDL Cholesterol 10/12/2022 11  5 - 40 mg/dL Final   • LDL/HDL Ratio 10/12/2022 2.94   Final                            Assessment and Plan    Diagnoses and all orders for this visit:    1. Acute non-recurrent pansinusitis (Primary)    2. Acute URI    3. Lateral epicondylitis of left elbow    Other orders  -     amoxicillin-clavulanate (Augmentin) 875-125 MG per tablet; Take 1 tablet by mouth 2 (Two) Times a Day.  Dispense: 20 tablet; Refill: 0  -     methylPREDNISolone (MEDROL) 4 MG dose pack; Take as directed on package instructions.  Dispense: 21 tablet; Refill: 0      Start Augmentin and Medrol Dosepak  Continue Mucinex, DayQuil or NyQuil  rec tennis elbow strap, ibuprofen, medrol should help as well, try to limit use      I spent 20 minutes caring for Dhruv Levine on this date of service. This time includes time spent by me in the following activities: preparing for the visit, reviewing tests, performing a medically appropriate examination and/or evaluation , counseling and educating the patient/family/caregiver, ordering medications, tests, or procedures and documenting information in the medical record        Follow Up     Return if symptoms worsen or fail to improve in 3-4 days, for Next scheduled follow up.  Patient was given instructions and counseling regarding his condition or for health maintenance advice. Please see specific information pulled into the AVS if appropriate.        Part of this note may be an electronic transcription/translation of spoken language to printed text using the Dragon Dictation System

## 2023-01-26 RX ORDER — CHLORCYCLIZINE HYDROCHLORIDE AND PSEUDOEPHEDRINE HYDROCHLORIDE 25; 60 MG/1; MG/1
1 TABLET ORAL EVERY 8 HOURS PRN
Qty: 40 TABLET | Refills: 0 | Status: SHIPPED | OUTPATIENT
Start: 2023-01-26

## 2023-01-26 RX ORDER — METHYLPREDNISOLONE 4 MG/1
TABLET ORAL
Qty: 21 TABLET | Refills: 0 | Status: SHIPPED | OUTPATIENT
Start: 2023-01-26

## 2023-02-01 ENCOUNTER — TELEPHONE (OUTPATIENT)
Dept: FAMILY MEDICINE CLINIC | Facility: CLINIC | Age: 58
End: 2023-02-01
Payer: COMMERCIAL

## 2023-02-01 RX ORDER — CEFDINIR 300 MG/1
300 CAPSULE ORAL 2 TIMES DAILY
Qty: 14 CAPSULE | Refills: 0 | Status: SHIPPED | OUTPATIENT
Start: 2023-02-01 | End: 2023-02-08

## 2023-02-01 NOTE — TELEPHONE ENCOUNTER
Pt called in with concern of still not feeling well.  Pt states glands still swollen, looses voice half way through the day, will wake up feeling fine and as the day goes on starts to feel worse.  Pt was wanting to make an appointment to be seen, no available appt until March.  Please advise

## 2023-03-14 ENCOUNTER — TELEPHONE (OUTPATIENT)
Dept: FAMILY MEDICINE CLINIC | Facility: CLINIC | Age: 58
End: 2023-03-14
Payer: COMMERCIAL

## 2023-03-14 NOTE — TELEPHONE ENCOUNTER
Spoke to pt about eye dr info so we can get a copy of his last exam. Pt said he does not have an eye dr and he does not see the need for one

## 2023-04-10 ENCOUNTER — OFFICE VISIT (OUTPATIENT)
Dept: FAMILY MEDICINE CLINIC | Facility: CLINIC | Age: 58
End: 2023-04-10
Payer: COMMERCIAL

## 2023-04-10 VITALS
WEIGHT: 217 LBS | OXYGEN SATURATION: 96 % | SYSTOLIC BLOOD PRESSURE: 142 MMHG | BODY MASS INDEX: 29.39 KG/M2 | HEART RATE: 83 BPM | DIASTOLIC BLOOD PRESSURE: 84 MMHG | HEIGHT: 72 IN

## 2023-04-10 DIAGNOSIS — Z87.19 HISTORY OF BARRETT'S ESOPHAGUS: ICD-10-CM

## 2023-04-10 DIAGNOSIS — Z12.5 PROSTATE CANCER SCREENING: ICD-10-CM

## 2023-04-10 DIAGNOSIS — E11.9 DIET-CONTROLLED DIABETES MELLITUS: ICD-10-CM

## 2023-04-10 DIAGNOSIS — I10 ESSENTIAL HYPERTENSION: ICD-10-CM

## 2023-04-10 DIAGNOSIS — Z23 NEED FOR SHINGLES VACCINE: ICD-10-CM

## 2023-04-10 DIAGNOSIS — E78.2 MIXED HYPERLIPIDEMIA: ICD-10-CM

## 2023-04-10 DIAGNOSIS — Z00.00 PREVENTATIVE HEALTH CARE: Primary | ICD-10-CM

## 2023-04-10 DIAGNOSIS — K21.9 GASTROESOPHAGEAL REFLUX DISEASE WITHOUT ESOPHAGITIS: ICD-10-CM

## 2023-04-10 PROCEDURE — 85027 COMPLETE CBC AUTOMATED: CPT | Performed by: NURSE PRACTITIONER

## 2023-04-10 PROCEDURE — 83036 HEMOGLOBIN GLYCOSYLATED A1C: CPT | Performed by: NURSE PRACTITIONER

## 2023-04-10 PROCEDURE — 80061 LIPID PANEL: CPT | Performed by: NURSE PRACTITIONER

## 2023-04-10 PROCEDURE — G0103 PSA SCREENING: HCPCS | Performed by: NURSE PRACTITIONER

## 2023-04-10 PROCEDURE — 82043 UR ALBUMIN QUANTITATIVE: CPT | Performed by: NURSE PRACTITIONER

## 2023-04-10 PROCEDURE — 80053 COMPREHEN METABOLIC PANEL: CPT | Performed by: NURSE PRACTITIONER

## 2023-04-10 RX ORDER — OMEPRAZOLE 20 MG/1
20 CAPSULE, DELAYED RELEASE ORAL DAILY
Qty: 90 CAPSULE | Refills: 1 | Status: SHIPPED | OUTPATIENT
Start: 2023-04-10

## 2023-04-10 RX ORDER — LOSARTAN POTASSIUM 100 MG/1
100 TABLET ORAL DAILY
Qty: 90 TABLET | Refills: 1 | Status: SHIPPED | OUTPATIENT
Start: 2023-04-10

## 2023-04-10 NOTE — PROGRESS NOTES
Venipuncture Blood Specimen Collection  Venipuncture performed in left arm by Haylie Black MA with good hemostasis. Patient tolerated the procedure well without complications.   04/10/23   Haylie Black MA      Injection  Injection performed in left arm by Haylie Black MA. Patient tolerated the procedure well without complications.  04/10/23   Haylie Black MA

## 2023-04-10 NOTE — PROGRESS NOTES
Chief Complaint  Chief Complaint   Patient presents with   • Annual Exam     No concerns    • Med Refill     Pt needs refill on test strips            Subjective          Dhruv Levine presents to [facility] for   History of Present Illness     Patient presents for annual exam.  Overall doing well, recovered from URI after 6-8 wks.     Diet-controlled diabetes mellitus, denies any signs/symptoms of hyper/hypoglycemia, blurry vision, polydipsia, polyuria, nocturia, and unintentional weight loss    GERD, history of Andersen's esophagus, stable on medication, denies nausea, vomiting, constipation, abdominal pain and diarrhea.  He followed with Dr. Liu, Colonoscopy is overdue, cost is very high with his insurance.     Hyperlipidemia, The patient denies muscle aches, constipation, diarrhea, GI upset, fatigue, chest pain/pressure, exercise intolerance, dyspnea, palpitations, syncope and pedal edema.      Patient has been following with Dr. Ponce for left inguinal hernia, surgery for repair was cancelled in December per pt      The following portions of the patient's history were reviewed and updated as appropriate: allergies, current medications, past family history, past medical history, past social history, past surgical history and problem list.    Past Medical History:   Diagnosis Date   • Abdominal pain, epigastric    • Acute left otitis media    • Andersen's esophagus    • Biceps tendonitis, right    • BPPV (benign paroxysmal positional vertigo), left    • Bronchitis, acute    • Colon polyp    • Diabetes mellitus type II, controlled    • Diverticulitis of colon    • Diverticulosis    • Dyspnea    • Exposure to hepatitis A    • Fatigue    • GERD (gastroesophageal reflux disease)    • Hand swelling    • HBP (high blood pressure)    • Hiatal hernia    • Insomnia    • Mass of finger    • Orthopedic aftercare    • Overweight    • Pain in right shoulder    • Persistent cough    • Sinus  "congestion    • Situational anxiety    • Thoracic outlet syndrome      Past Surgical History:   Procedure Laterality Date   • CHOLECYSTECTOMY     • SHOULDER ARTHROSCOPY Right 07/13/2018   • SHOULDER SURGERY Right 2011/2017     Family History   Problem Relation Age of Onset   • No Known Problems Mother    • Hypertension Father    • No Known Problems Sister    • No Known Problems Brother    • No Known Problems Daughter    • No Known Problems Son    • No Known Problems Maternal Aunt    • No Known Problems Maternal Uncle    • No Known Problems Paternal Aunt    • No Known Problems Paternal Uncle    • No Known Problems Maternal Grandmother    • No Known Problems Maternal Grandfather    • No Known Problems Paternal Grandmother    • No Known Problems Paternal Grandfather      Social History     Tobacco Use   • Smoking status: Never   • Smokeless tobacco: Never   Substance Use Topics   • Alcohol use: Yes       Current Outpatient Medications:   •  Blood Glucose Monitoring Suppl (ONE TOUCH ULTRA 2) w/Device kit, ONETOUCH ULTRA 2 w/Device KIT, Disp: , Rfl:   •  glucose blood test strip, 1 each by Other route Daily. Use as instructed, Disp: 50 each, Rfl: 0  •  losartan (COZAAR) 100 MG tablet, Take 1 tablet by mouth Daily., Disp: 90 tablet, Rfl: 1  •  Multiple Vitamins-Minerals (ZINC PO), Take  by mouth., Disp: , Rfl:   •  multivitamin with minerals tablet tablet, Take 1 tablet by mouth Daily., Disp: , Rfl:   •  omeprazole (priLOSEC) 20 MG capsule, Take 1 capsule by mouth Daily., Disp: 90 capsule, Rfl: 1  •  ONETOUCH DELICA LANCETS 33G misc, ONETOUCH DELICA LANCETS 33G, Disp: , Rfl:     Objective   Vital Signs:   /84 (BP Location: Right arm, Patient Position: Sitting, Cuff Size: Large Adult)   Pulse 83   Ht 182.9 cm (72\")   Wt 98.4 kg (217 lb)   SpO2 96%   BMI 29.43 kg/m²           Physical Exam  Vitals and nursing note reviewed.   Constitutional:       General: He is not in acute distress.     Appearance: Normal " appearance. He is well-developed. He is not ill-appearing or diaphoretic.   HENT:      Head: Normocephalic and atraumatic.   Eyes:      Pupils: Pupils are equal, round, and reactive to light.   Neck:      Thyroid: No thyromegaly.   Cardiovascular:      Rate and Rhythm: Normal rate and regular rhythm.      Heart sounds: Normal heart sounds. No murmur heard.  Pulmonary:      Effort: Pulmonary effort is normal. No respiratory distress.      Breath sounds: Normal breath sounds. No wheezing or rhonchi.   Abdominal:      General: Bowel sounds are normal. There is no distension.      Palpations: Abdomen is soft.      Tenderness: There is no abdominal tenderness.   Musculoskeletal:         General: No swelling. Normal range of motion.      Cervical back: Normal range of motion.   Skin:     General: Skin is warm and dry.      Findings: No erythema.   Neurological:      General: No focal deficit present.      Mental Status: He is alert and oriented to person, place, and time. Mental status is at baseline.   Psychiatric:         Mood and Affect: Mood normal.         Behavior: Behavior normal.         Thought Content: Thought content normal.         Judgment: Judgment normal.          Result Review :     No visits with results within 7 Day(s) from this visit.   Latest known visit with results is:   Office Visit on 10/10/2022   Component Date Value Ref Range Status   • Glucose 10/12/2022 106 (H)  65 - 99 mg/dL Final   • BUN 10/12/2022 16  6 - 20 mg/dL Final   • Creatinine 10/12/2022 0.87  0.76 - 1.27 mg/dL Final   • Sodium 10/12/2022 142  136 - 145 mmol/L Final   • Potassium 10/12/2022 4.9  3.5 - 5.2 mmol/L Final   • Chloride 10/12/2022 106  98 - 107 mmol/L Final   • CO2 10/12/2022 28.0  22.0 - 29.0 mmol/L Final   • Calcium 10/12/2022 9.4  8.6 - 10.5 mg/dL Final   • Total Protein 10/12/2022 6.9  6.0 - 8.5 g/dL Final   • Albumin 10/12/2022 4.70  3.50 - 5.20 g/dL Final   • ALT (SGPT) 10/12/2022 17  1 - 41 U/L Final   • AST (SGOT)  10/12/2022 21  1 - 40 U/L Final   • Alkaline Phosphatase 10/12/2022 83  39 - 117 U/L Final   • Total Bilirubin 10/12/2022 0.5  0.0 - 1.2 mg/dL Final   • Globulin 10/12/2022 2.2  gm/dL Final   • A/G Ratio 10/12/2022 2.1  g/dL Final   • BUN/Creatinine Ratio 10/12/2022 18.4  7.0 - 25.0 Final   • Anion Gap 10/12/2022 8.0  5.0 - 15.0 mmol/L Final   • eGFR 10/12/2022 100.0  >60.0 mL/min/1.73 Final    National Kidney Foundation and American Society of Nephrology (ASN) Task Force recommended calculation based on the Chronic Kidney Disease Epidemiology Collaboration (CKD-EPI) equation refit without adjustment for race.   • WBC 10/12/2022 7.27  3.40 - 10.80 10*3/mm3 Final   • RBC 10/12/2022 5.35  4.14 - 5.80 10*6/mm3 Final   • Hemoglobin 10/12/2022 15.5  13.0 - 17.7 g/dL Final   • Hematocrit 10/12/2022 45.6  37.5 - 51.0 % Final   • MCV 10/12/2022 85.2  79.0 - 97.0 fL Final   • MCH 10/12/2022 29.0  26.6 - 33.0 pg Final   • MCHC 10/12/2022 34.0  31.5 - 35.7 g/dL Final   • RDW 10/12/2022 12.7  12.3 - 15.4 % Final   • RDW-SD 10/12/2022 38.2  37.0 - 54.0 fl Final   • MPV 10/12/2022 11.3  6.0 - 12.0 fL Final   • Platelets 10/12/2022 193  140 - 450 10*3/mm3 Final   • Hemoglobin A1C 10/12/2022 5.6  3.5 - 5.6 % Final   • Total Cholesterol 10/12/2022 164  0 - 200 mg/dL Final   • Triglycerides 10/12/2022 52  0 - 150 mg/dL Final   • HDL Cholesterol 10/12/2022 39 (L)  40 - 60 mg/dL Final   • LDL Cholesterol  10/12/2022 114 (H)  0 - 100 mg/dL Final   • VLDL Cholesterol 10/12/2022 11  5 - 40 mg/dL Final   • LDL/HDL Ratio 10/12/2022 2.94   Final                  BMI is >= 25 and <30. (Overweight) The following options were offered after discussion;: exercise counseling/recommendations and nutrition counseling/recommendations           Assessment and Plan    Diagnoses and all orders for this visit:    1. Preventative health care (Primary)  Comments:  pt to call insurance and gastro to sched EGD/c-scope  Labs today as ordered  shingrix #1  today, rtc in 10 weeks for #2  tdap and pneumo 20 next visit  Orders:  -     Comprehensive Metabolic Panel  -     CBC (No Diff)  -     Hemoglobin A1c  -     Lipid Panel  -     MicroAlbumin, Urine, Random - Urine, Clean Catch    2. Need for shingles vaccine  -     Shingrix Vaccine    3. Essential hypertension  Comments:  BP slightly elevated in office, stable outside of the office. continue/refill losartan 100 mg    Orders:  -     losartan (COZAAR) 100 MG tablet; Take 1 tablet by mouth Daily.  Dispense: 90 tablet; Refill: 1  -     Comprehensive Metabolic Panel  -     CBC (No Diff)  -     Lipid Panel    4. Gastroesophageal reflux disease without esophagitis  Comments:  Recommend pt to call and schedule appointment for colonoscopy/EGD.    Continue and refill omeprazole, okay to try every other day or as needed  Orders:  -     omeprazole (priLOSEC) 20 MG capsule; Take 1 capsule by mouth Daily.  Dispense: 90 capsule; Refill: 1    5. Diet-controlled diabetes mellitus  Comments:  Check hemoglobin A1c today  Continue healthy lifestyle and diabetic diet  Orders:  -     glucose blood test strip; 1 each by Other route Daily. Use as instructed  Dispense: 50 each; Refill: 0  -     Hemoglobin A1c  -     MicroAlbumin, Urine, Random - Urine, Clean Catch    6. Prostate cancer screening  -     PSA Screen    7. History of Andersen's esophagus    8. Mixed hyperlipidemia  Comments:  Mild, continue exercising in gym 3 to 4 days/week, healthy lifestyle and diet modifications      Age appropriate preventative counseling provided, including healthy lifestyle modifications and exercise      I spent 30 minutes caring for Dhruv Levine on this date of service. This time includes time spent by me in the following activities: preparing for the visit, reviewing tests, performing a medically appropriate examination and/or evaluation , counseling and educating the patient/family/caregiver, ordering medications, tests, or procedures and  documenting information in the medical record        Follow Up     Return in about 6 months (around 10/10/2023) for Recheck.  Patient was given instructions and counseling regarding his condition or for health maintenance advice. Please see specific information pulled into the AVS if appropriate.        Part of this note may be an electronic transcription/translation of spoken language to printed text using the Dragon Dictation System

## 2023-04-11 LAB
ALBUMIN SERPL-MCNC: 4.8 G/DL (ref 3.5–5.2)
ALBUMIN UR-MCNC: <1.2 MG/DL
ALBUMIN/GLOB SERPL: 2.3 G/DL
ALP SERPL-CCNC: 82 U/L (ref 39–117)
ALT SERPL W P-5'-P-CCNC: 17 U/L (ref 1–41)
ANION GAP SERPL CALCULATED.3IONS-SCNC: 8.2 MMOL/L (ref 5–15)
AST SERPL-CCNC: 17 U/L (ref 1–40)
BILIRUB SERPL-MCNC: 0.6 MG/DL (ref 0–1.2)
BUN SERPL-MCNC: 11 MG/DL (ref 6–20)
BUN/CREAT SERPL: 10.4 (ref 7–25)
CALCIUM SPEC-SCNC: 9.3 MG/DL (ref 8.6–10.5)
CHLORIDE SERPL-SCNC: 102 MMOL/L (ref 98–107)
CHOLEST SERPL-MCNC: 184 MG/DL (ref 0–200)
CO2 SERPL-SCNC: 28.8 MMOL/L (ref 22–29)
CREAT SERPL-MCNC: 1.06 MG/DL (ref 0.76–1.27)
DEPRECATED RDW RBC AUTO: 40.1 FL (ref 37–54)
EGFRCR SERPLBLD CKD-EPI 2021: 81.3 ML/MIN/1.73
ERYTHROCYTE [DISTWIDTH] IN BLOOD BY AUTOMATED COUNT: 13 % (ref 12.3–15.4)
GLOBULIN UR ELPH-MCNC: 2.1 GM/DL
GLUCOSE SERPL-MCNC: 117 MG/DL (ref 65–99)
HBA1C MFR BLD: 5.6 % (ref 4.8–5.6)
HCT VFR BLD AUTO: 46.4 % (ref 37.5–51)
HDLC SERPL-MCNC: 41 MG/DL (ref 40–60)
HGB BLD-MCNC: 15.6 G/DL (ref 13–17.7)
LDLC SERPL CALC-MCNC: 131 MG/DL (ref 0–100)
LDLC/HDLC SERPL: 3.18 {RATIO}
MCH RBC QN AUTO: 29.1 PG (ref 26.6–33)
MCHC RBC AUTO-ENTMCNC: 33.6 G/DL (ref 31.5–35.7)
MCV RBC AUTO: 86.4 FL (ref 79–97)
PLATELET # BLD AUTO: 209 10*3/MM3 (ref 140–450)
PMV BLD AUTO: 11.1 FL (ref 6–12)
POTASSIUM SERPL-SCNC: 4.1 MMOL/L (ref 3.5–5.2)
PROT SERPL-MCNC: 6.9 G/DL (ref 6–8.5)
PSA SERPL-MCNC: 1.52 NG/ML (ref 0–4)
RBC # BLD AUTO: 5.37 10*6/MM3 (ref 4.14–5.8)
SODIUM SERPL-SCNC: 139 MMOL/L (ref 136–145)
TRIGL SERPL-MCNC: 63 MG/DL (ref 0–150)
VLDLC SERPL-MCNC: 12 MG/DL (ref 5–40)
WBC NRBC COR # BLD: 5.42 10*3/MM3 (ref 3.4–10.8)

## 2023-10-18 DIAGNOSIS — I10 ESSENTIAL HYPERTENSION: ICD-10-CM

## 2023-10-18 DIAGNOSIS — K21.9 GASTROESOPHAGEAL REFLUX DISEASE WITHOUT ESOPHAGITIS: ICD-10-CM

## 2023-10-19 RX ORDER — OMEPRAZOLE 20 MG/1
20 CAPSULE, DELAYED RELEASE ORAL DAILY
Qty: 90 CAPSULE | Refills: 0 | Status: SHIPPED | OUTPATIENT
Start: 2023-10-19

## 2023-10-19 RX ORDER — LOSARTAN POTASSIUM 100 MG/1
100 TABLET ORAL DAILY
Qty: 90 TABLET | Refills: 0 | Status: SHIPPED | OUTPATIENT
Start: 2023-10-19

## 2024-01-15 DIAGNOSIS — K21.9 GASTROESOPHAGEAL REFLUX DISEASE WITHOUT ESOPHAGITIS: ICD-10-CM

## 2024-01-15 DIAGNOSIS — I10 ESSENTIAL HYPERTENSION: ICD-10-CM

## 2024-01-17 RX ORDER — LOSARTAN POTASSIUM 100 MG/1
100 TABLET ORAL DAILY
Qty: 90 TABLET | Refills: 0 | Status: SHIPPED | OUTPATIENT
Start: 2024-01-17

## 2024-01-17 RX ORDER — OMEPRAZOLE 20 MG/1
20 CAPSULE, DELAYED RELEASE ORAL DAILY
Qty: 90 CAPSULE | Refills: 0 | Status: SHIPPED | OUTPATIENT
Start: 2024-01-17

## 2024-04-17 DIAGNOSIS — I10 ESSENTIAL HYPERTENSION: ICD-10-CM

## 2024-04-17 DIAGNOSIS — K21.9 GASTROESOPHAGEAL REFLUX DISEASE WITHOUT ESOPHAGITIS: ICD-10-CM

## 2024-04-19 RX ORDER — LOSARTAN POTASSIUM 100 MG/1
100 TABLET ORAL DAILY
Qty: 90 TABLET | Refills: 0 | Status: SHIPPED | OUTPATIENT
Start: 2024-04-19

## 2024-04-19 RX ORDER — OMEPRAZOLE 20 MG/1
20 CAPSULE, DELAYED RELEASE ORAL DAILY
Qty: 90 CAPSULE | Refills: 0 | Status: SHIPPED | OUTPATIENT
Start: 2024-04-19

## 2024-07-17 DIAGNOSIS — I10 ESSENTIAL HYPERTENSION: ICD-10-CM

## 2024-07-17 DIAGNOSIS — K21.9 GASTROESOPHAGEAL REFLUX DISEASE WITHOUT ESOPHAGITIS: ICD-10-CM

## 2024-07-18 RX ORDER — LOSARTAN POTASSIUM 100 MG/1
100 TABLET ORAL DAILY
Qty: 90 TABLET | Refills: 0 | Status: SHIPPED | OUTPATIENT
Start: 2024-07-18

## 2024-07-18 RX ORDER — OMEPRAZOLE 20 MG/1
20 CAPSULE, DELAYED RELEASE ORAL DAILY
Qty: 90 CAPSULE | Refills: 0 | Status: SHIPPED | OUTPATIENT
Start: 2024-07-18

## 2024-08-22 ENCOUNTER — OFFICE VISIT (OUTPATIENT)
Dept: FAMILY MEDICINE CLINIC | Facility: CLINIC | Age: 59
End: 2024-08-22
Payer: COMMERCIAL

## 2024-08-22 ENCOUNTER — LAB (OUTPATIENT)
Dept: FAMILY MEDICINE CLINIC | Facility: CLINIC | Age: 59
End: 2024-08-22
Payer: COMMERCIAL

## 2024-08-22 VITALS
HEART RATE: 79 BPM | BODY MASS INDEX: 29.2 KG/M2 | HEIGHT: 72 IN | TEMPERATURE: 97.8 F | DIASTOLIC BLOOD PRESSURE: 94 MMHG | OXYGEN SATURATION: 98 % | RESPIRATION RATE: 18 BRPM | SYSTOLIC BLOOD PRESSURE: 150 MMHG | WEIGHT: 215.6 LBS

## 2024-08-22 DIAGNOSIS — K21.9 GASTROESOPHAGEAL REFLUX DISEASE WITHOUT ESOPHAGITIS: ICD-10-CM

## 2024-08-22 DIAGNOSIS — Z12.5 SCREENING PSA (PROSTATE SPECIFIC ANTIGEN): ICD-10-CM

## 2024-08-22 DIAGNOSIS — B35.1 ONYCHOMYCOSIS: ICD-10-CM

## 2024-08-22 DIAGNOSIS — Z00.00 PREVENTATIVE HEALTH CARE: Primary | ICD-10-CM

## 2024-08-22 DIAGNOSIS — E11.9 TYPE 2 DIABETES MELLITUS WITHOUT COMPLICATION, WITHOUT LONG-TERM CURRENT USE OF INSULIN: ICD-10-CM

## 2024-08-22 DIAGNOSIS — I10 ESSENTIAL HYPERTENSION: ICD-10-CM

## 2024-08-22 PROCEDURE — 99396 PREV VISIT EST AGE 40-64: CPT | Performed by: NURSE PRACTITIONER

## 2024-08-22 PROCEDURE — 83036 HEMOGLOBIN GLYCOSYLATED A1C: CPT | Performed by: NURSE PRACTITIONER

## 2024-08-22 PROCEDURE — 80053 COMPREHEN METABOLIC PANEL: CPT | Performed by: NURSE PRACTITIONER

## 2024-08-22 PROCEDURE — 82043 UR ALBUMIN QUANTITATIVE: CPT | Performed by: NURSE PRACTITIONER

## 2024-08-22 PROCEDURE — 84443 ASSAY THYROID STIM HORMONE: CPT | Performed by: NURSE PRACTITIONER

## 2024-08-22 PROCEDURE — 36415 COLL VENOUS BLD VENIPUNCTURE: CPT | Performed by: NURSE PRACTITIONER

## 2024-08-22 PROCEDURE — 85027 COMPLETE CBC AUTOMATED: CPT | Performed by: NURSE PRACTITIONER

## 2024-08-22 PROCEDURE — 80061 LIPID PANEL: CPT | Performed by: NURSE PRACTITIONER

## 2024-08-22 PROCEDURE — G0103 PSA SCREENING: HCPCS | Performed by: NURSE PRACTITIONER

## 2024-08-22 RX ORDER — ZINC SULFATE 50(220)MG
1 CAPSULE ORAL DAILY
COMMUNITY

## 2024-08-22 NOTE — PROGRESS NOTES
Chief Complaint  Chief Complaint   Patient presents with    Annual Exam    Diabetes     Last eye exam unknown. Foot exam performed today, wnl- nail fungus           Subjective          Dhruv Levine presents to [facility] for   History of Present Illness    Patient presents for annual exam. He states he doesn't sleep well, wakes throughout the night multiple times, he has for years, this is not new, he tried melatonin and not effective.  He has not drank alcohol in over 30 days but hasn't helped sleep. Did keto diet, got down to 201 pounds, now 210-215, maintaining.  He is very active, plays golf 2-3 times per week.    Diet-controlled diabetes mellitus, denies any signs/symptoms of hyper/hypoglycemia, blurry vision, polydipsia, polyuria, nocturia, and unintentional weight loss     GERD, history of Andersen's esophagus and colon polyps, he takes omeprazole daily.  Denies nausea, vomiting, constipation, abdominal pain and diarrhea.  He followed with Dr. Liu in past, Colonoscopy is overdue, cost is very high with his insurance.       Hyperlipidemia, The patient denies muscle aches, constipation, diarrhea, GI upset, fatigue, chest pain/pressure, exercise intolerance, dyspnea, palpitations, syncope and pedal edema.       Patient has been following with Dr. Ponce for left inguinal hernia, surgery for repair was cancelled in December per pt    He complains of toenail fungus, has tried OTCs without improvement    The following portions of the patient's history were reviewed and updated as appropriate: allergies, current medications, past family history, past medical history, past social history, past surgical history and problem list.    Past Medical History:   Diagnosis Date    Abdominal pain, epigastric     Acute left otitis media     Andersen's esophagus     Biceps tendonitis, right     BPPV (benign paroxysmal positional vertigo), left     Bronchitis, acute     Colon polyp     Diabetes  mellitus type II, controlled     Diverticulitis of colon     Diverticulosis     Dyspnea     Exposure to hepatitis A     Fatigue     GERD (gastroesophageal reflux disease)     Hand swelling     HBP (high blood pressure)     Hiatal hernia     Insomnia     Mass of finger     Orthopedic aftercare     Overweight     Pain in right shoulder     Persistent cough     Sinus congestion     Situational anxiety     Thoracic outlet syndrome      Past Surgical History:   Procedure Laterality Date    CHOLECYSTECTOMY      SHOULDER ARTHROSCOPY Right 07/13/2018    SHOULDER SURGERY Right 2011/2017     Family History   Problem Relation Age of Onset    No Known Problems Mother     Hypertension Father     No Known Problems Sister     No Known Problems Brother     No Known Problems Daughter     No Known Problems Son     No Known Problems Maternal Aunt     No Known Problems Maternal Uncle     No Known Problems Paternal Aunt     No Known Problems Paternal Uncle     No Known Problems Maternal Grandmother     No Known Problems Maternal Grandfather     No Known Problems Paternal Grandmother     No Known Problems Paternal Grandfather      Social History     Tobacco Use    Smoking status: Never    Smokeless tobacco: Never   Substance Use Topics    Alcohol use: Yes       Current Outpatient Medications:     losartan (COZAAR) 100 MG tablet, TAKE 1 TABLET BY MOUTH DAILY, Disp: 90 tablet, Rfl: 0    Multiple Vitamins-Minerals (ZINC PO), Take  by mouth., Disp: , Rfl:     multivitamin with minerals tablet tablet, Take 1 tablet by mouth Daily., Disp: , Rfl:     omeprazole (priLOSEC) 20 MG capsule, TAKE 1 CAPSULE BY MOUTH DAILY, Disp: 90 capsule, Rfl: 0    Zinc 220 (50 Zn) MG capsule, Take 1 capsule by mouth Daily., Disp: , Rfl:     Fluconazole 2 %, Terbinafine HCl 3 %, Ibuprofen 3 %, vitamin D 50,000 Units, Dimethyl Sulfoxide 50 %, Apply 15 mL topically to the appropriate area as directed 2 (Two) Times a Day., Disp: 900 mL, Rfl: 0    Objective   Vital  "Signs:   /94 (BP Location: Left arm, Patient Position: Sitting, Cuff Size: Large Adult)   Pulse 79   Temp 97.8 °F (36.6 °C) (Oral)   Resp 18   Ht 182.9 cm (72\")   Wt 97.8 kg (215 lb 9.6 oz)   SpO2 98% Comment: Room air  BMI 29.24 kg/m²           Physical Exam  Constitutional:       General: He is not in acute distress.     Appearance: Normal appearance. He is well-developed. He is not ill-appearing or diaphoretic.   HENT:      Head: Normocephalic.   Eyes:      Conjunctiva/sclera: Conjunctivae normal.      Pupils: Pupils are equal, round, and reactive to light.   Neck:      Thyroid: No thyromegaly.      Vascular: No JVD.   Cardiovascular:      Rate and Rhythm: Normal rate and regular rhythm.      Heart sounds: Normal heart sounds. No murmur heard.  Pulmonary:      Effort: Pulmonary effort is normal. No respiratory distress.      Breath sounds: Normal breath sounds. No wheezing or rhonchi.   Abdominal:      General: Bowel sounds are normal. There is no distension.      Palpations: Abdomen is soft.      Tenderness: There is no abdominal tenderness.   Musculoskeletal:         General: No swelling or tenderness. Normal range of motion.      Cervical back: Normal range of motion and neck supple. No tenderness.   Lymphadenopathy:      Cervical: No cervical adenopathy.   Skin:     General: Skin is warm and dry.      Coloration: Skin is not jaundiced.      Findings: No erythema or rash.   Neurological:      General: No focal deficit present.      Mental Status: He is alert and oriented to person, place, and time. Mental status is at baseline.      Sensory: No sensory deficit.      Motor: No weakness.      Gait: Gait normal.   Psychiatric:         Mood and Affect: Mood normal.         Behavior: Behavior normal.         Thought Content: Thought content normal.         Judgment: Judgment normal.          Result Review :     Office Visit on 08/22/2024   Component Date Value Ref Range Status    Glucose 08/22/2024 107 " (H)  65 - 99 mg/dL Final    BUN 08/22/2024 12  6 - 20 mg/dL Final    Creatinine 08/22/2024 1.04  0.76 - 1.27 mg/dL Final    Sodium 08/22/2024 140  136 - 145 mmol/L Final    Potassium 08/22/2024 4.6  3.5 - 5.2 mmol/L Final    Chloride 08/22/2024 105  98 - 107 mmol/L Final    CO2 08/22/2024 27.0  22.0 - 29.0 mmol/L Final    Calcium 08/22/2024 9.6  8.6 - 10.5 mg/dL Final    Total Protein 08/22/2024 7.2  6.0 - 8.5 g/dL Final    Albumin 08/22/2024 4.6  3.5 - 5.2 g/dL Final    ALT (SGPT) 08/22/2024 22  1 - 41 U/L Final    AST (SGOT) 08/22/2024 20  1 - 40 U/L Final    Alkaline Phosphatase 08/22/2024 92  39 - 117 U/L Final    Total Bilirubin 08/22/2024 0.5  0.0 - 1.2 mg/dL Final    Globulin 08/22/2024 2.6  gm/dL Final    A/G Ratio 08/22/2024 1.8  g/dL Final    BUN/Creatinine Ratio 08/22/2024 11.5  7.0 - 25.0 Final    Anion Gap 08/22/2024 8.0  5.0 - 15.0 mmol/L Final    eGFR 08/22/2024 82.7  >60.0 mL/min/1.73 Final    WBC 08/22/2024 7.23  3.40 - 10.80 10*3/mm3 Final    RBC 08/22/2024 5.69  4.14 - 5.80 10*6/mm3 Final    Hemoglobin 08/22/2024 16.6  13.0 - 17.7 g/dL Final    Hematocrit 08/22/2024 49.6  37.5 - 51.0 % Final    MCV 08/22/2024 87.2  79.0 - 97.0 fL Final    MCH 08/22/2024 29.2  26.6 - 33.0 pg Final    MCHC 08/22/2024 33.5  31.5 - 35.7 g/dL Final    RDW 08/22/2024 12.5  12.3 - 15.4 % Final    RDW-SD 08/22/2024 39.1  37.0 - 54.0 fl Final    MPV 08/22/2024 10.6  6.0 - 12.0 fL Final    Platelets 08/22/2024 205  140 - 450 10*3/mm3 Final    Hemoglobin A1C 08/22/2024 5.80 (H)  4.80 - 5.60 % Final    Total Cholesterol 08/22/2024 190  0 - 200 mg/dL Final    Triglycerides 08/22/2024 62  0 - 150 mg/dL Final    HDL Cholesterol 08/22/2024 34 (L)  40 - 60 mg/dL Final    LDL Cholesterol  08/22/2024 144 (H)  0 - 100 mg/dL Final    VLDL Cholesterol 08/22/2024 12  5 - 40 mg/dL Final    LDL/HDL Ratio 08/22/2024 4.22   Final    TSH 08/22/2024 1.140  0.270 - 4.200 uIU/mL Final    PSA 08/22/2024 1.470  0.000 - 4.000 ng/mL Final     Microalbumin, Urine 08/22/2024 <1.2  mg/dL Final                  BMI is >= 25 and <30. (Overweight) The following options were offered after discussion;: exercise counseling/recommendations and nutrition counseling/recommendations           Assessment and Plan    Diagnoses and all orders for this visit:    1. Preventative health care (Primary)    2. Type 2 diabetes mellitus without complication, without long-term current use of insulin  -     Hemoglobin A1c  -     MicroAlbumin, Urine, Random - Urine, Clean Catch    3. Essential hypertension  -     Comprehensive Metabolic Panel  -     CBC (No Diff)  -     Lipid Panel  -     TSH    4. Gastroesophageal reflux disease without esophagitis    5. Screening PSA (prostate specific antigen)  -     PSA Screen    6. Onychomycosis  -     Fluconazole 2 %, Terbinafine HCl 3 %, Ibuprofen 3 %, vitamin D 50,000 Units, Dimethyl Sulfoxide 50 %; Apply 15 mL topically to the appropriate area as directed 2 (Two) Times a Day.  Dispense: 900 mL; Refill: 0      Conditions stable  Continue current medication regimen  Previous labs reviewed and essentially stable  Labs today as ordered, will notify results   Patient will schedule colonoscopy when better covered by insurance  Age appropriate preventative counseling provided, including healthy lifestyle modifications and exercise      I spent 30 minutes caring for Dhruv Levine on this date of service. This time includes time spent by me in the following activities: preparing for the visit, reviewing tests, performing a medically appropriate examination and/or evaluation , counseling and educating the patient/family/caregiver, ordering medications, tests, or procedures and documenting information in the medical record        Follow Up     Return in about 1 year (around 8/22/2025) for Annual physical.  Patient was given instructions and counseling regarding his condition or for health maintenance advice. Please see specific information pulled  into the AVS if appropriate.        Part of this note may be an electronic transcription/translation of spoken language to printed text using the Dragon Dictation System

## 2024-08-23 LAB
ALBUMIN SERPL-MCNC: 4.6 G/DL (ref 3.5–5.2)
ALBUMIN UR-MCNC: <1.2 MG/DL
ALBUMIN/GLOB SERPL: 1.8 G/DL
ALP SERPL-CCNC: 92 U/L (ref 39–117)
ALT SERPL W P-5'-P-CCNC: 22 U/L (ref 1–41)
ANION GAP SERPL CALCULATED.3IONS-SCNC: 8 MMOL/L (ref 5–15)
AST SERPL-CCNC: 20 U/L (ref 1–40)
BILIRUB SERPL-MCNC: 0.5 MG/DL (ref 0–1.2)
BUN SERPL-MCNC: 12 MG/DL (ref 6–20)
BUN/CREAT SERPL: 11.5 (ref 7–25)
CALCIUM SPEC-SCNC: 9.6 MG/DL (ref 8.6–10.5)
CHLORIDE SERPL-SCNC: 105 MMOL/L (ref 98–107)
CHOLEST SERPL-MCNC: 190 MG/DL (ref 0–200)
CO2 SERPL-SCNC: 27 MMOL/L (ref 22–29)
CREAT SERPL-MCNC: 1.04 MG/DL (ref 0.76–1.27)
DEPRECATED RDW RBC AUTO: 39.1 FL (ref 37–54)
EGFRCR SERPLBLD CKD-EPI 2021: 82.7 ML/MIN/1.73
ERYTHROCYTE [DISTWIDTH] IN BLOOD BY AUTOMATED COUNT: 12.5 % (ref 12.3–15.4)
GLOBULIN UR ELPH-MCNC: 2.6 GM/DL
GLUCOSE SERPL-MCNC: 107 MG/DL (ref 65–99)
HBA1C MFR BLD: 5.8 % (ref 4.8–5.6)
HCT VFR BLD AUTO: 49.6 % (ref 37.5–51)
HDLC SERPL-MCNC: 34 MG/DL (ref 40–60)
HGB BLD-MCNC: 16.6 G/DL (ref 13–17.7)
LDLC SERPL CALC-MCNC: 144 MG/DL (ref 0–100)
LDLC/HDLC SERPL: 4.22 {RATIO}
MCH RBC QN AUTO: 29.2 PG (ref 26.6–33)
MCHC RBC AUTO-ENTMCNC: 33.5 G/DL (ref 31.5–35.7)
MCV RBC AUTO: 87.2 FL (ref 79–97)
PLATELET # BLD AUTO: 205 10*3/MM3 (ref 140–450)
PMV BLD AUTO: 10.6 FL (ref 6–12)
POTASSIUM SERPL-SCNC: 4.6 MMOL/L (ref 3.5–5.2)
PROT SERPL-MCNC: 7.2 G/DL (ref 6–8.5)
PSA SERPL-MCNC: 1.47 NG/ML (ref 0–4)
RBC # BLD AUTO: 5.69 10*6/MM3 (ref 4.14–5.8)
SODIUM SERPL-SCNC: 140 MMOL/L (ref 136–145)
TRIGL SERPL-MCNC: 62 MG/DL (ref 0–150)
TSH SERPL DL<=0.05 MIU/L-ACNC: 1.14 UIU/ML (ref 0.27–4.2)
VLDLC SERPL-MCNC: 12 MG/DL (ref 5–40)
WBC NRBC COR # BLD AUTO: 7.23 10*3/MM3 (ref 3.4–10.8)

## 2024-10-15 DIAGNOSIS — I10 ESSENTIAL HYPERTENSION: ICD-10-CM

## 2024-10-15 DIAGNOSIS — K21.9 GASTROESOPHAGEAL REFLUX DISEASE WITHOUT ESOPHAGITIS: ICD-10-CM

## 2024-10-16 RX ORDER — LOSARTAN POTASSIUM 100 MG/1
100 TABLET ORAL DAILY
Qty: 90 TABLET | Refills: 0 | Status: SHIPPED | OUTPATIENT
Start: 2024-10-16

## 2025-01-16 DIAGNOSIS — I10 ESSENTIAL HYPERTENSION: ICD-10-CM

## 2025-01-16 DIAGNOSIS — K21.9 GASTROESOPHAGEAL REFLUX DISEASE WITHOUT ESOPHAGITIS: ICD-10-CM

## 2025-01-17 RX ORDER — LOSARTAN POTASSIUM 100 MG/1
100 TABLET ORAL DAILY
Qty: 90 TABLET | Refills: 0 | Status: SHIPPED | OUTPATIENT
Start: 2025-01-17

## 2025-04-14 DIAGNOSIS — I10 ESSENTIAL HYPERTENSION: ICD-10-CM

## 2025-04-14 DIAGNOSIS — K21.9 GASTROESOPHAGEAL REFLUX DISEASE WITHOUT ESOPHAGITIS: ICD-10-CM

## 2025-04-14 RX ORDER — OMEPRAZOLE 20 MG/1
20 CAPSULE, DELAYED RELEASE ORAL DAILY
Qty: 90 CAPSULE | Refills: 0 | Status: SHIPPED | OUTPATIENT
Start: 2025-04-14

## 2025-04-14 RX ORDER — LOSARTAN POTASSIUM 100 MG/1
100 TABLET ORAL DAILY
Qty: 90 TABLET | Refills: 0 | Status: SHIPPED | OUTPATIENT
Start: 2025-04-14

## 2025-06-05 ENCOUNTER — OFFICE VISIT (OUTPATIENT)
Dept: FAMILY MEDICINE CLINIC | Facility: CLINIC | Age: 60
End: 2025-06-05
Payer: COMMERCIAL

## 2025-06-05 VITALS
HEART RATE: 89 BPM | TEMPERATURE: 97.9 F | OXYGEN SATURATION: 98 % | SYSTOLIC BLOOD PRESSURE: 143 MMHG | BODY MASS INDEX: 30.07 KG/M2 | HEIGHT: 72 IN | DIASTOLIC BLOOD PRESSURE: 84 MMHG | WEIGHT: 222 LBS

## 2025-06-05 DIAGNOSIS — J01.40 ACUTE NON-RECURRENT PANSINUSITIS: ICD-10-CM

## 2025-06-05 DIAGNOSIS — J30.1 SEASONAL ALLERGIC RHINITIS DUE TO POLLEN: Primary | ICD-10-CM

## 2025-06-05 DIAGNOSIS — K91.5 POST-CHOLECYSTECTOMY SYNDROME: ICD-10-CM

## 2025-06-05 DIAGNOSIS — R20.0 NUMBNESS OF FINGERS: ICD-10-CM

## 2025-06-05 DIAGNOSIS — G47.09 OTHER INSOMNIA: ICD-10-CM

## 2025-06-05 PROCEDURE — 99214 OFFICE O/P EST MOD 30 MIN: CPT | Performed by: NURSE PRACTITIONER

## 2025-06-05 RX ORDER — AZITHROMYCIN 250 MG/1
TABLET, FILM COATED ORAL
Qty: 6 TABLET | Refills: 0 | Status: SHIPPED | OUTPATIENT
Start: 2025-06-05

## 2025-06-05 RX ORDER — AZELASTINE 1 MG/ML
2 SPRAY, METERED NASAL 2 TIMES DAILY
Qty: 30 ML | Refills: 2 | Status: SHIPPED | OUTPATIENT
Start: 2025-06-05

## 2025-06-05 RX ORDER — CHLORCYCLIZINE HYDROCHLORIDE AND PSEUDOEPHEDRINE HYDROCHLORIDE 25; 60 MG/1; MG/1
1 TABLET ORAL EVERY 8 HOURS PRN
Qty: 40 TABLET | Refills: 0 | Status: SHIPPED | OUTPATIENT
Start: 2025-06-05

## 2025-06-05 RX ORDER — METHYLPREDNISOLONE 4 MG/1
TABLET ORAL
Qty: 21 TABLET | Refills: 0 | Status: SHIPPED | OUTPATIENT
Start: 2025-06-05

## 2025-06-05 RX ORDER — COLESTIPOL HYDROCHLORIDE 1 G/1
1 TABLET ORAL DAILY
Qty: 90 TABLET | Refills: 1 | Status: SHIPPED | OUTPATIENT
Start: 2025-06-05

## 2025-06-05 NOTE — PROGRESS NOTES
Chief Complaint  Chief Complaint   Patient presents with    Follow-up     Pt c/o traveling numbness in right fingertips    head congestion     With productive cough with yellow phlegm, HA x 10 days  Has been taking robitussen, blanca seltzer and sudafed. Cough wakes him up at 430AM           Subjective          Dhruv Levine presents to [facility] for   History of Present Illness    Pt reports having change in bowel habits, hasn't had solid BM in 3 weeks, reports having all liquid diarrhea or chunky, he is no longer doing keto. He has a history of cholecystectomy.  He denies blood, mucus in stool    Sinus pressure, drainage 1.5 weeks, nonproductive cough, dry and yellow in am, sore throat. Took zicam, Taking blanca-seltzer, robitussin, tyl/ibuprofen, sudafed prn, all minimally effective.     Right middle tip of finger chronic numbness. Now with tip of index and right thumb tingle/numbness for 2 days. Denies pain of wrist/thumb    Insomnia, sleep improved with mag glycinate        The following portions of the patient's history were reviewed and updated as appropriate: allergies, current medications, past family history, past medical history, past social history, past surgical history and problem list.    Past Medical History:   Diagnosis Date    Abdominal pain, epigastric     Acute left otitis media     Andersen's esophagus     Biceps tendonitis, right     BPPV (benign paroxysmal positional vertigo), left     Bronchitis, acute     Colon polyp     Diabetes mellitus type II, controlled     Diverticulitis of colon     Diverticulosis     Dyspnea     Exposure to hepatitis A     Fatigue     GERD (gastroesophageal reflux disease)     Hand swelling     HBP (high blood pressure)     Hiatal hernia     Insomnia     Mass of finger     Orthopedic aftercare     Overweight     Pain in right shoulder     Persistent cough     Sinus congestion     Situational anxiety     Thoracic outlet syndrome      Past  Surgical History:   Procedure Laterality Date    CHOLECYSTECTOMY      SHOULDER ARTHROSCOPY Right 07/13/2018    SHOULDER SURGERY Right 2011/2017     Family History   Problem Relation Age of Onset    No Known Problems Mother     Hypertension Father     No Known Problems Sister     No Known Problems Brother     No Known Problems Daughter     No Known Problems Son     No Known Problems Maternal Aunt     No Known Problems Maternal Uncle     No Known Problems Paternal Aunt     No Known Problems Paternal Uncle     No Known Problems Maternal Grandmother     No Known Problems Maternal Grandfather     No Known Problems Paternal Grandmother     No Known Problems Paternal Grandfather      Social History     Tobacco Use    Smoking status: Never    Smokeless tobacco: Never   Substance Use Topics    Alcohol use: Yes       Current Outpatient Medications:     losartan (COZAAR) 100 MG tablet, TAKE 1 TABLET BY MOUTH DAILY, Disp: 90 tablet, Rfl: 0    MAGNESIUM PO, Take  by mouth., Disp: , Rfl:     omeprazole (priLOSEC) 20 MG capsule, TAKE 1 CAPSULE BY MOUTH DAILY, Disp: 90 capsule, Rfl: 0    Zinc 220 (50 Zn) MG capsule, Take 1 capsule by mouth Daily., Disp: , Rfl:     azelastine (ASTELIN) 0.1 % nasal spray, Administer 2 sprays into the nostril(s) as directed by provider 2 (Two) Times a Day. Use in each nostril as directed, Disp: 30 mL, Rfl: 2    azithromycin (Zithromax) 250 MG tablet, Take 2 tablets the first day, then 1 tablet daily for 4 days., Disp: 6 tablet, Rfl: 0    Chlorcyclizine-Pseudoephed (Stahist AD) 25-60 MG tablet, Take 1 tablet by mouth Every 8 (Eight) Hours As Needed (sinus pain/congestion)., Disp: 40 tablet, Rfl: 0    colestipol (COLESTID) 1 g tablet, Take 1 tablet by mouth Daily., Disp: 90 tablet, Rfl: 1    methylPREDNISolone (MEDROL) 4 MG dose pack, Take as directed on package instructions., Disp: 21 tablet, Rfl: 0    Objective   Vital Signs:   Vitals:    06/05/25 1531   BP: 143/84   BP Location: Left arm   Patient  "Position: Sitting   Cuff Size: Large Adult   Pulse: 89   Temp: 97.9 °F (36.6 °C)   TempSrc: Oral   SpO2: 98%   Weight: 101 kg (222 lb)   Height: 182.9 cm (72\")   PainSc: 7    PainLoc: Head       Body mass index is 30.11 kg/m².    Physical Exam  Constitutional:       General: He is not in acute distress.     Appearance: Normal appearance. He is well-developed. He is not ill-appearing or diaphoretic.   HENT:      Head: Normocephalic.      Right Ear: Tympanic membrane and ear canal normal.      Left Ear: Tympanic membrane and ear canal normal.      Nose: Congestion (purulent post nasal drip) present.      Mouth/Throat:      Pharynx: Posterior oropharyngeal erythema present.   Eyes:      Conjunctiva/sclera: Conjunctivae normal.      Pupils: Pupils are equal, round, and reactive to light.   Neck:      Thyroid: No thyromegaly.      Vascular: No JVD.   Cardiovascular:      Rate and Rhythm: Normal rate and regular rhythm.      Heart sounds: Normal heart sounds. No murmur heard.  Pulmonary:      Effort: Pulmonary effort is normal. No respiratory distress.      Breath sounds: Normal breath sounds. No wheezing or rhonchi.   Abdominal:      General: Bowel sounds are normal. There is no distension.      Palpations: Abdomen is soft.      Tenderness: There is no abdominal tenderness.   Musculoskeletal:         General: No swelling or tenderness. Normal range of motion.      Cervical back: Normal range of motion and neck supple. No tenderness.   Lymphadenopathy:      Cervical: No cervical adenopathy.   Skin:     General: Skin is warm and dry.      Coloration: Skin is not jaundiced.      Findings: No erythema or rash.   Neurological:      General: No focal deficit present.      Mental Status: He is alert and oriented to person, place, and time. Mental status is at baseline.      Sensory: Sensory deficit (R tip of thumb, index and middle finger numbness) present.      Motor: No weakness.      Gait: Gait normal.   Psychiatric:         " Mood and Affect: Mood normal.         Behavior: Behavior normal.         Thought Content: Thought content normal.         Judgment: Judgment normal.          Result Review :     No visits with results within 7 Day(s) from this visit.   Latest known visit with results is:   Office Visit on 08/22/2024   Component Date Value Ref Range Status    Glucose 08/22/2024 107 (H)  65 - 99 mg/dL Final    BUN 08/22/2024 12  6 - 20 mg/dL Final    Creatinine 08/22/2024 1.04  0.76 - 1.27 mg/dL Final    Sodium 08/22/2024 140  136 - 145 mmol/L Final    Potassium 08/22/2024 4.6  3.5 - 5.2 mmol/L Final    Chloride 08/22/2024 105  98 - 107 mmol/L Final    CO2 08/22/2024 27.0  22.0 - 29.0 mmol/L Final    Calcium 08/22/2024 9.6  8.6 - 10.5 mg/dL Final    Total Protein 08/22/2024 7.2  6.0 - 8.5 g/dL Final    Albumin 08/22/2024 4.6  3.5 - 5.2 g/dL Final    ALT (SGPT) 08/22/2024 22  1 - 41 U/L Final    AST (SGOT) 08/22/2024 20  1 - 40 U/L Final    Alkaline Phosphatase 08/22/2024 92  39 - 117 U/L Final    Total Bilirubin 08/22/2024 0.5  0.0 - 1.2 mg/dL Final    Globulin 08/22/2024 2.6  gm/dL Final    A/G Ratio 08/22/2024 1.8  g/dL Final    BUN/Creatinine Ratio 08/22/2024 11.5  7.0 - 25.0 Final    Anion Gap 08/22/2024 8.0  5.0 - 15.0 mmol/L Final    eGFR 08/22/2024 82.7  >60.0 mL/min/1.73 Final    WBC 08/22/2024 7.23  3.40 - 10.80 10*3/mm3 Final    RBC 08/22/2024 5.69  4.14 - 5.80 10*6/mm3 Final    Hemoglobin 08/22/2024 16.6  13.0 - 17.7 g/dL Final    Hematocrit 08/22/2024 49.6  37.5 - 51.0 % Final    MCV 08/22/2024 87.2  79.0 - 97.0 fL Final    MCH 08/22/2024 29.2  26.6 - 33.0 pg Final    MCHC 08/22/2024 33.5  31.5 - 35.7 g/dL Final    RDW 08/22/2024 12.5  12.3 - 15.4 % Final    RDW-SD 08/22/2024 39.1  37.0 - 54.0 fl Final    MPV 08/22/2024 10.6  6.0 - 12.0 fL Final    Platelets 08/22/2024 205  140 - 450 10*3/mm3 Final    Hemoglobin A1C 08/22/2024 5.80 (H)  4.80 - 5.60 % Final    Total Cholesterol 08/22/2024 190  0 - 200 mg/dL Final     Triglycerides 08/22/2024 62  0 - 150 mg/dL Final    HDL Cholesterol 08/22/2024 34 (L)  40 - 60 mg/dL Final    LDL Cholesterol  08/22/2024 144 (H)  0 - 100 mg/dL Final    VLDL Cholesterol 08/22/2024 12  5 - 40 mg/dL Final    LDL/HDL Ratio 08/22/2024 4.22   Final    TSH 08/22/2024 1.140  0.270 - 4.200 uIU/mL Final    PSA 08/22/2024 1.470  0.000 - 4.000 ng/mL Final    Microalbumin, Urine 08/22/2024 <1.2  mg/dL Final                              Assessment and Plan    Diagnoses and all orders for this visit:    1. Seasonal allergic rhinitis due to pollen (Primary)  -     azelastine (ASTELIN) 0.1 % nasal spray; Administer 2 sprays into the nostril(s) as directed by provider 2 (Two) Times a Day. Use in each nostril as directed  Dispense: 30 mL; Refill: 2  -     Chlorcyclizine-Pseudoephed (Stahist AD) 25-60 MG tablet; Take 1 tablet by mouth Every 8 (Eight) Hours As Needed (sinus pain/congestion).  Dispense: 40 tablet; Refill: 0    2. Acute non-recurrent pansinusitis  -     methylPREDNISolone (MEDROL) 4 MG dose pack; Take as directed on package instructions.  Dispense: 21 tablet; Refill: 0  -     azithromycin (Zithromax) 250 MG tablet; Take 2 tablets the first day, then 1 tablet daily for 4 days.  Dispense: 6 tablet; Refill: 0    3. Numbness of fingers  -     methylPREDNISolone (MEDROL) 4 MG dose pack; Take as directed on package instructions.  Dispense: 21 tablet; Refill: 0    4. Post-cholecystectomy syndrome  -     colestipol (COLESTID) 1 g tablet; Take 1 tablet by mouth Daily.  Dispense: 90 tablet; Refill: 1    5. Other insomnia        I spent 30 minutes caring for Dhruv Levine on this date of service. This time includes time spent by me in the following activities: preparing for the visit, reviewing tests, performing a medically appropriate examination and/or evaluation , counseling and educating the patient/family/caregiver, ordering medications, tests, or procedures and documenting information in the medical  record        Follow Up     Return for Next scheduled follow up, Annual physical.  Patient was given instructions and counseling regarding his condition or for health maintenance advice. Please see specific information pulled into the AVS if appropriate.        Part of this note may be an electronic transcription/translation of spoken language to printed text using the Dragon Dictation System

## 2025-07-14 DIAGNOSIS — K21.9 GASTROESOPHAGEAL REFLUX DISEASE WITHOUT ESOPHAGITIS: ICD-10-CM

## 2025-07-14 DIAGNOSIS — I10 ESSENTIAL HYPERTENSION: ICD-10-CM

## 2025-07-15 RX ORDER — OMEPRAZOLE 20 MG/1
20 CAPSULE, DELAYED RELEASE ORAL DAILY
Qty: 90 CAPSULE | Refills: 0 | Status: SHIPPED | OUTPATIENT
Start: 2025-07-15

## 2025-07-15 RX ORDER — LOSARTAN POTASSIUM 100 MG/1
100 TABLET ORAL DAILY
Qty: 90 TABLET | Refills: 0 | Status: SHIPPED | OUTPATIENT
Start: 2025-07-15